# Patient Record
Sex: MALE | Race: OTHER | NOT HISPANIC OR LATINO | URBAN - METROPOLITAN AREA
[De-identification: names, ages, dates, MRNs, and addresses within clinical notes are randomized per-mention and may not be internally consistent; named-entity substitution may affect disease eponyms.]

---

## 2017-05-03 ENCOUNTER — OUTPATIENT (OUTPATIENT)
Dept: OUTPATIENT SERVICES | Facility: HOSPITAL | Age: 37
LOS: 1 days | End: 2017-05-03
Payer: COMMERCIAL

## 2017-05-03 ENCOUNTER — APPOINTMENT (OUTPATIENT)
Dept: MRI IMAGING | Facility: CLINIC | Age: 37
End: 2017-05-03

## 2017-05-03 DIAGNOSIS — Z00.8 ENCOUNTER FOR OTHER GENERAL EXAMINATION: ICD-10-CM

## 2017-05-03 PROCEDURE — 72141 MRI NECK SPINE W/O DYE: CPT

## 2017-10-17 ENCOUNTER — TRANSCRIPTION ENCOUNTER (OUTPATIENT)
Age: 37
End: 2017-10-17

## 2018-01-15 ENCOUNTER — TRANSCRIPTION ENCOUNTER (OUTPATIENT)
Age: 38
End: 2018-01-15

## 2020-04-25 ENCOUNTER — MESSAGE (OUTPATIENT)
Age: 40
End: 2020-04-25

## 2020-05-08 LAB
SARS-COV-2 IGG SERPL IA-ACNC: <0.1 INDEX
SARS-COV-2 IGG SERPL QL IA: NEGATIVE

## 2021-02-05 ENCOUNTER — APPOINTMENT (OUTPATIENT)
Dept: INTERNAL MEDICINE | Facility: CLINIC | Age: 41
End: 2021-02-05
Payer: COMMERCIAL

## 2021-02-05 ENCOUNTER — LABORATORY RESULT (OUTPATIENT)
Age: 41
End: 2021-02-05

## 2021-02-05 VITALS
HEIGHT: 68.25 IN | BODY MASS INDEX: 28.49 KG/M2 | HEART RATE: 91 BPM | WEIGHT: 188 LBS | SYSTOLIC BLOOD PRESSURE: 140 MMHG | OXYGEN SATURATION: 99 % | TEMPERATURE: 97.7 F | DIASTOLIC BLOOD PRESSURE: 90 MMHG

## 2021-02-05 DIAGNOSIS — H57.10 OCULAR PAIN, UNSPECIFIED EYE: ICD-10-CM

## 2021-02-05 DIAGNOSIS — Z70.8 OTHER SEX COUNSELING: ICD-10-CM

## 2021-02-05 DIAGNOSIS — Z83.3 FAMILY HISTORY OF DIABETES MELLITUS: ICD-10-CM

## 2021-02-05 DIAGNOSIS — Z78.9 OTHER SPECIFIED HEALTH STATUS: ICD-10-CM

## 2021-02-05 DIAGNOSIS — Z23 ENCOUNTER FOR IMMUNIZATION: ICD-10-CM

## 2021-02-05 DIAGNOSIS — Z80.49 FAMILY HISTORY OF MALIGNANT NEOPLASM OF OTHER GENITAL ORGANS: ICD-10-CM

## 2021-02-05 DIAGNOSIS — Z82.3 FAMILY HISTORY OF STROKE: ICD-10-CM

## 2021-02-05 DIAGNOSIS — Z82.49 FAMILY HISTORY OF ISCHEMIC HEART DISEASE AND OTHER DISEASES OF THE CIRCULATORY SYSTEM: ICD-10-CM

## 2021-02-05 PROCEDURE — 99072 ADDL SUPL MATRL&STAF TM PHE: CPT

## 2021-02-05 PROCEDURE — 99386 PREV VISIT NEW AGE 40-64: CPT | Mod: 25

## 2021-02-05 PROCEDURE — 36415 COLL VENOUS BLD VENIPUNCTURE: CPT

## 2021-02-05 PROCEDURE — G0444 DEPRESSION SCREEN ANNUAL: CPT | Mod: NC,59

## 2021-02-05 PROCEDURE — 90715 TDAP VACCINE 7 YRS/> IM: CPT

## 2021-02-05 PROCEDURE — 90471 IMMUNIZATION ADMIN: CPT

## 2021-02-05 NOTE — DATA REVIEWED
[No studies available for review at this time.] : No studies available for review at this time. regular rate and rhythm

## 2021-02-05 NOTE — HISTORY OF PRESENT ILLNESS
[FreeTextEntry1] : Establish care  [de-identified] : Mr. JAKUB RODRIGUEZ is a 40 year old man with pmhx of disk disease, prediabetes, hypercholesterolemia who presents to [facility] care. He endorses being in good health and good mood. 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 He has been eating healthy and exercise. He endorses having chronic low back pain, had relief after seeing quiropracter. Has not follow through.

## 2021-02-05 NOTE — ASSESSMENT
[FreeTextEntry1] : #HCM- Continue with healthy diet and exercise. Labs ordered as below. Hep c and HIV ordered. Referral provided as below. Patient has forms for school to be filled out.

## 2021-02-05 NOTE — HEALTH RISK ASSESSMENT
[Yes] : Yes [Monthly or less (1 pt)] : Monthly or less (1 point) [1 or 2 (0 pts)] : 1 or 2 (0 points) [Never (0 pts)] : Never (0 points) [No] : In the past 12 months have you used drugs other than those required for medical reasons? No [No falls in past year] : Patient reported no falls in the past year [0] : 2) Feeling down, depressed, or hopeless: Not at all (0) [HIV Test offered] : HIV Test offered [Hepatitis C test offered] : Hepatitis C test offered [None] : None [With Family] : lives with family [Employed] : employed [Single] : single [Feels Safe at Home] : Feels safe at home [Fully functional (bathing, dressing, toileting, transferring, walking, feeding)] : Fully functional (bathing, dressing, toileting, transferring, walking, feeding) [Fully functional (using the telephone, shopping, preparing meals, housekeeping, doing laundry, using] : Fully functional and needs no help or supervision to perform IADLs (using the telephone, shopping, preparing meals, housekeeping, doing laundry, using transportation, managing medications and managing finances) [Smoke Detector] : smoke detector [Carbon Monoxide Detector] : carbon monoxide detector [Good] : ~his/her~  mood as  good [] : No [[id number]] : 0

## 2021-02-05 NOTE — REVIEW OF SYSTEMS
[Pain] : pain [Back Pain] : back pain [Negative] : Integumentary [Vision Problems] : no vision problems [Abdominal Pain] : no abdominal pain [Nausea] : no nausea [Constipation] : no constipation [Diarrhea] : no diarrhea [Vomiting] : no vomiting [Melena] : no melena [Dysuria] : no dysuria [Hematuria] : no hematuria

## 2021-02-05 NOTE — PHYSICAL EXAM
[No Acute Distress] : no acute distress [Well Nourished] : well nourished [Well Developed] : well developed [Normal Sclera/Conjunctiva] : normal sclera/conjunctiva [EOMI] : extraocular movements intact [Normal Outer Ear/Nose] : the outer ears and nose were normal in appearance [Normal Oropharynx] : the oropharynx was normal [No JVD] : no jugular venous distention [No Lymphadenopathy] : no lymphadenopathy [Supple] : supple [No Respiratory Distress] : no respiratory distress  [Clear to Auscultation] : lungs were clear to auscultation bilaterally [Normal Rate] : normal rate  [Regular Rhythm] : with a regular rhythm [Normal S1, S2] : normal S1 and S2 [No Murmur] : no murmur heard [Pedal Pulses Present] : the pedal pulses are present [No Edema] : there was no peripheral edema [No Extremity Clubbing/Cyanosis] : no extremity clubbing/cyanosis [Soft] : abdomen soft [Non Tender] : non-tender [Non-distended] : non-distended [Normal Bowel Sounds] : normal bowel sounds [Normal Posterior Cervical Nodes] : no posterior cervical lymphadenopathy [Normal Anterior Cervical Nodes] : no anterior cervical lymphadenopathy [No CVA Tenderness] : no CVA  tenderness [No Spinal Tenderness] : no spinal tenderness [No Joint Swelling] : no joint swelling [Grossly Normal Strength/Tone] : grossly normal strength/tone [No Rash] : no rash [Coordination Grossly Intact] : coordination grossly intact [No Focal Deficits] : no focal deficits [Normal Gait] : normal gait [Normal Insight/Judgement] : insight and judgment were intact

## 2021-02-08 LAB
25(OH)D3 SERPL-MCNC: 47.3 NG/ML
ALBUMIN SERPL ELPH-MCNC: 5 G/DL
ALP BLD-CCNC: 61 U/L
ALT SERPL-CCNC: 32 U/L
ANION GAP SERPL CALC-SCNC: 11 MMOL/L
APO LP(A) SERPL-MCNC: 26.3 NMOL/L
APPEARANCE: CLEAR
AST SERPL-CCNC: 28 U/L
BACTERIA: NEGATIVE
BASOPHILS # BLD AUTO: 0.05 K/UL
BASOPHILS NFR BLD AUTO: 0.7 %
BILIRUB SERPL-MCNC: 0.4 MG/DL
BILIRUBIN URINE: NEGATIVE
BLOOD URINE: NEGATIVE
BUN SERPL-MCNC: 17 MG/DL
CALCIUM SERPL-MCNC: 9.8 MG/DL
CHLORIDE SERPL-SCNC: 103 MMOL/L
CHOLEST SERPL-MCNC: 186 MG/DL
CO2 SERPL-SCNC: 26 MMOL/L
COLOR: YELLOW
CREAT SERPL-MCNC: 0.98 MG/DL
CRP SERPL-MCNC: 0.1 MG/DL
EOSINOPHIL # BLD AUTO: 0.18 K/UL
EOSINOPHIL NFR BLD AUTO: 2.6 %
ERYTHROCYTE [SEDIMENTATION RATE] IN BLOOD BY WESTERGREN METHOD: 8 MM/HR
ESTIMATED AVERAGE GLUCOSE: 111 MG/DL
GLUCOSE QUALITATIVE U: NEGATIVE
GLUCOSE SERPL-MCNC: 87 MG/DL
HBA1C MFR BLD HPLC: 5.5 %
HBV CORE IGG+IGM SER QL: NONREACTIVE
HBV SURFACE AB SERPL IA-ACNC: 18.7 MIU/ML
HBV SURFACE AG SER QL: NONREACTIVE
HCT VFR BLD CALC: 45.9 %
HCV AB SER QL: NONREACTIVE
HCV S/CO RATIO: 0.11 S/CO
HDLC SERPL-MCNC: 55 MG/DL
HGB BLD-MCNC: 15.1 G/DL
HIV1+2 AB SPEC QL IA.RAPID: NONREACTIVE
HYALINE CASTS: 0 /LPF
IMM GRANULOCYTES NFR BLD AUTO: 0.3 %
KETONES URINE: NEGATIVE
LDLC SERPL CALC-MCNC: 120 MG/DL
LEUKOCYTE ESTERASE URINE: NEGATIVE
LYMPHOCYTES # BLD AUTO: 2.27 K/UL
LYMPHOCYTES NFR BLD AUTO: 32.3 %
M TB IFN-G BLD-IMP: NEGATIVE
MAN DIFF?: NORMAL
MCHC RBC-ENTMCNC: 28.5 PG
MCHC RBC-ENTMCNC: 32.9 GM/DL
MCV RBC AUTO: 86.8 FL
MEV IGG FLD QL IA: 45.6 AU/ML
MEV IGG+IGM SER-IMP: POSITIVE
MICROSCOPIC-UA: NORMAL
MONOCYTES # BLD AUTO: 0.58 K/UL
MONOCYTES NFR BLD AUTO: 8.3 %
MUV AB SER-ACNC: POSITIVE
MUV IGG SER QL IA: 39.5 AU/ML
NEUTROPHILS # BLD AUTO: 3.92 K/UL
NEUTROPHILS NFR BLD AUTO: 55.8 %
NITRITE URINE: NEGATIVE
NONHDLC SERPL-MCNC: 131 MG/DL
PH URINE: 6
PLATELET # BLD AUTO: 250 K/UL
POTASSIUM SERPL-SCNC: 4.7 MMOL/L
PROT SERPL-MCNC: 7.6 G/DL
PROTEIN URINE: NEGATIVE
PSA SERPL-MCNC: 0.89 NG/ML
QUANTIFERON TB PLUS MITOGEN MINUS NIL: 9.08 IU/ML
QUANTIFERON TB PLUS NIL: 0.09 IU/ML
QUANTIFERON TB PLUS TB1 MINUS NIL: 0 IU/ML
QUANTIFERON TB PLUS TB2 MINUS NIL: 0 IU/ML
RBC # BLD: 5.29 M/UL
RBC # FLD: 12.4 %
RED BLOOD CELLS URINE: 0 /HPF
RUBV IGG FLD-ACNC: 0.7 INDEX
RUBV IGG SER-IMP: NEGATIVE
SODIUM SERPL-SCNC: 140 MMOL/L
SPECIFIC GRAVITY URINE: 1.03
SQUAMOUS EPITHELIAL CELLS: 1 /HPF
TRIGL SERPL-MCNC: 55 MG/DL
TSH SERPL-ACNC: 4.35 UIU/ML
UROBILINOGEN URINE: NORMAL
VIT B12 SERPL-MCNC: 622 PG/ML
VZV AB TITR SER: POSITIVE
VZV IGG SER IF-ACNC: >4000 INDEX
WBC # FLD AUTO: 7.02 K/UL
WHITE BLOOD CELLS URINE: 0 /HPF

## 2021-02-09 ENCOUNTER — TRANSCRIPTION ENCOUNTER (OUTPATIENT)
Age: 41
End: 2021-02-09

## 2021-02-12 ENCOUNTER — APPOINTMENT (OUTPATIENT)
Dept: INTERNAL MEDICINE | Facility: CLINIC | Age: 41
End: 2021-02-12
Payer: COMMERCIAL

## 2021-02-12 DIAGNOSIS — Z23 ENCOUNTER FOR IMMUNIZATION: ICD-10-CM

## 2021-02-12 PROCEDURE — 90707 MMR VACCINE SC: CPT

## 2021-02-12 PROCEDURE — 99072 ADDL SUPL MATRL&STAF TM PHE: CPT

## 2021-02-12 PROCEDURE — 90471 IMMUNIZATION ADMIN: CPT

## 2021-02-16 ENCOUNTER — APPOINTMENT (OUTPATIENT)
Dept: FAMILY MEDICINE | Facility: CLINIC | Age: 41
End: 2021-02-16

## 2021-02-17 ENCOUNTER — NON-APPOINTMENT (OUTPATIENT)
Age: 41
End: 2021-02-17

## 2021-02-19 ENCOUNTER — APPOINTMENT (OUTPATIENT)
Dept: ORTHOPEDIC SURGERY | Facility: CLINIC | Age: 41
End: 2021-02-19
Payer: COMMERCIAL

## 2021-02-19 VITALS — BODY MASS INDEX: 28.49 KG/M2 | WEIGHT: 188 LBS | HEIGHT: 68.25 IN

## 2021-02-19 VITALS — SYSTOLIC BLOOD PRESSURE: 139 MMHG | DIASTOLIC BLOOD PRESSURE: 90 MMHG | HEART RATE: 88 BPM

## 2021-02-19 PROCEDURE — 99072 ADDL SUPL MATRL&STAF TM PHE: CPT

## 2021-02-19 PROCEDURE — 99204 OFFICE O/P NEW MOD 45 MIN: CPT

## 2021-02-19 PROCEDURE — 72110 X-RAY EXAM L-2 SPINE 4/>VWS: CPT

## 2021-02-22 NOTE — HISTORY OF PRESENT ILLNESS
[de-identified] : This is a 40-year-old male that presents with low back pain as well as radicular type symptoms that have been worsening over the past month.  He states that he has pain that radiates down from his hips down to his thighs and into his groin.  It is involving his entire thighs bilaterally.  He denies any bowel bladder issues denies saddle anesthesia.  He does feel like this pain interferes with his ability to walk long distances.  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  He has tried conservative management including Aleve, Flexeril, tramadol, activity modifications but unfortunately his symptoms have persisted.

## 2021-02-22 NOTE — ASSESSMENT
[FreeTextEntry1] : This is a 40-year-old male that is been dealing with worsening back and lower extremity radicular type symptoms.  Given the duration of his symptoms and his mild weakness in his lower extremities I would like to proceed with a lumbar MRI.  I have also prescribed him a new round of physical therapy focused on core strengthening and lumbar stretching exercises.  He can take Tylenol ibuprofen for pain relief.  I will see him back in 2 to 3 weeks for repeat clinical evaluation.  I counseled him to reach out to me if his symptoms worsen or change at any point.\par \par The patient has tried the following treatments:\par Activity modification          +\par Ice/Compression                  +\par Braces                                     -\par NSAIDS                                   +\par Physical Therapy                   -\par \par

## 2021-02-22 NOTE — PHYSICAL EXAM
[de-identified] : Lumbar Physical Exam\par \par Gait -normal\par \par Station -normal\par \par Sagittal balance -all normal\par \par Compensatory mechanism? -None\par \par Heel walk -normal\par \par Toe walk -normal\par \par Reflexes\par Patellar -normal\par Gastroc -normal\par Clonus -no\par \par Hip Exam -normal\par \par Straight leg raise -positive\par \par Pulses -2+ DP/PT\par \par Range of motion -globally rated\par \par Sensation \par Sensation intact to light touch in L1, L2, L3, L4, L5 and S1 dermatomes bilaterally\par \par Motor\par 	IP	Quad	HS	TA	Gastroc	EHL\par Right	4/5	5/5	5/5	5/5	5/5	5/5\par Left	4/5	5/5	5/5	5/5	5/5	5/5\par \par  [de-identified] : Lumbar radiographs\par Lumbar lordosis maintained\par No instability on flexion-extension radiographs\par Minimal facet arthropathy noted

## 2021-02-24 ENCOUNTER — TRANSCRIPTION ENCOUNTER (OUTPATIENT)
Age: 41
End: 2021-02-24

## 2021-02-26 ENCOUNTER — APPOINTMENT (OUTPATIENT)
Dept: MRI IMAGING | Facility: CLINIC | Age: 41
End: 2021-02-26
Payer: COMMERCIAL

## 2021-02-26 ENCOUNTER — OUTPATIENT (OUTPATIENT)
Dept: OUTPATIENT SERVICES | Facility: HOSPITAL | Age: 41
LOS: 1 days | End: 2021-02-26
Payer: COMMERCIAL

## 2021-02-26 ENCOUNTER — RESULT REVIEW (OUTPATIENT)
Age: 41
End: 2021-02-26

## 2021-02-26 DIAGNOSIS — Z00.8 ENCOUNTER FOR OTHER GENERAL EXAMINATION: ICD-10-CM

## 2021-02-26 PROCEDURE — 72148 MRI LUMBAR SPINE W/O DYE: CPT

## 2021-02-26 PROCEDURE — 72148 MRI LUMBAR SPINE W/O DYE: CPT | Mod: 26

## 2021-03-05 ENCOUNTER — APPOINTMENT (OUTPATIENT)
Dept: ORTHOPEDIC SURGERY | Facility: CLINIC | Age: 41
End: 2021-03-05
Payer: COMMERCIAL

## 2021-03-05 VITALS — BODY MASS INDEX: 28.49 KG/M2 | WEIGHT: 188 LBS | HEIGHT: 68.25 IN

## 2021-03-05 DIAGNOSIS — Z78.9 OTHER SPECIFIED HEALTH STATUS: ICD-10-CM

## 2021-03-05 PROCEDURE — 99214 OFFICE O/P EST MOD 30 MIN: CPT

## 2021-03-05 PROCEDURE — 99072 ADDL SUPL MATRL&STAF TM PHE: CPT

## 2021-03-05 NOTE — HISTORY OF PRESENT ILLNESS
[de-identified] : This is a 40-year-old male that is here today for reevaluation of his low back and right-sided radicular type symptoms.  Since her last visit he was on a prednisone taper which she says helped him significantly.  Unfortunately he is still having some residual back and right lower extremity pain.  This pain does interfere with his ability to perform his activities of daily living.  He also feels weakness in his right leg.  He denies any bowel bladder issues.  He denies any saddle anesthesia.

## 2021-03-05 NOTE — PHYSICAL EXAM
[de-identified] : Lumbar Physical Exam\par \par Gait -slightly antalgic gait with pain on the right side\par \par Station -normal\par \par Sagittal balance -all normal\par \par Compensatory mechanism? -None\par \par Reflexes\par Patellar -normal\par Gastroc -normal\par Clonus -no\par \par Hip Exam -normal\par \par Straight leg raise -positive on right\par \par Pulses -2+ DP/PT\par \par Range of motion -globally rated\par \par Sensation \par Sensation intact to light touch in L1, L2, L3, L4, L5 and S1 dermatomes bilaterally\par \par Motor\par 	IP	Quad	HS	TA	Gastroc	EHL\par Right	4/5	5/5	5/5	5/5	5/5	5/5\par Left	5/5	5/5	5/5	5/5	5/5	5/5\par \par Pain and weakness on the right side persists [de-identified] : Lumbar MRI\par Lumbar spondylosis noted\par There is a small to moderate size disc protrusion/herniation at L3-L4 on the right within the foramen\par No areas of spondylolisthesis noted\par

## 2021-03-05 NOTE — ASSESSMENT
[FreeTextEntry1] : This is a 40-year-old male that is dealing with persistent back and right lower extremity radicular type symptoms.  His MRI does show a right-sided foraminal/paracentral disc herniation at L3-L4.  I think he will benefit from an L3-L4 translaminar epidural steroid injection.  This order has been placed today.  He should continue with physical therapy.  I will see him back in 3 to 4 weeks for repeat clinical evaluation.  Please note that over 30 minutes of time was spent in previsit preparation, post visit documentation and in person visit for this patient.

## 2021-04-02 ENCOUNTER — APPOINTMENT (OUTPATIENT)
Dept: ORTHOPEDIC SURGERY | Facility: CLINIC | Age: 41
End: 2021-04-02

## 2022-02-22 ENCOUNTER — NON-APPOINTMENT (OUTPATIENT)
Age: 42
End: 2022-02-22

## 2022-02-22 ENCOUNTER — APPOINTMENT (OUTPATIENT)
Dept: INTERNAL MEDICINE | Facility: CLINIC | Age: 42
End: 2022-02-22
Payer: COMMERCIAL

## 2022-02-22 VITALS
DIASTOLIC BLOOD PRESSURE: 86 MMHG | HEART RATE: 73 BPM | TEMPERATURE: 98.6 F | HEIGHT: 68 IN | OXYGEN SATURATION: 97 % | SYSTOLIC BLOOD PRESSURE: 128 MMHG | WEIGHT: 200 LBS | BODY MASS INDEX: 30.31 KG/M2

## 2022-02-22 DIAGNOSIS — Z00.00 ENCOUNTER FOR GENERAL ADULT MEDICAL EXAMINATION W/OUT ABNORMAL FINDINGS: ICD-10-CM

## 2022-02-22 DIAGNOSIS — E66.3 OVERWEIGHT: ICD-10-CM

## 2022-02-22 PROCEDURE — G0444 DEPRESSION SCREEN ANNUAL: CPT | Mod: 59

## 2022-02-22 PROCEDURE — 93000 ELECTROCARDIOGRAM COMPLETE: CPT | Mod: 59

## 2022-02-22 PROCEDURE — 99396 PREV VISIT EST AGE 40-64: CPT | Mod: 25

## 2022-02-22 PROCEDURE — 36415 COLL VENOUS BLD VENIPUNCTURE: CPT

## 2022-02-22 RX ORDER — IBUPROFEN 800 MG/1
800 TABLET, FILM COATED ORAL 3 TIMES DAILY
Qty: 28 | Refills: 0 | Status: DISCONTINUED | COMMUNITY
Start: 2021-02-19 | End: 2022-02-22

## 2022-02-22 RX ORDER — OMEPRAZOLE 20 MG/1
20 CAPSULE, DELAYED RELEASE ORAL TWICE DAILY
Qty: 30 | Refills: 0 | Status: DISCONTINUED | COMMUNITY
Start: 2021-02-19 | End: 2022-02-22

## 2022-02-22 RX ORDER — ACETAMINOPHEN 500 MG/1
500 TABLET, COATED ORAL
Qty: 50 | Refills: 0 | Status: DISCONTINUED | COMMUNITY
Start: 2021-02-19 | End: 2022-02-22

## 2022-02-22 RX ORDER — NAPROXEN 500 MG/1
500 TABLET ORAL
Qty: 60 | Refills: 1 | Status: DISCONTINUED | COMMUNITY
Start: 2021-02-05 | End: 2022-02-22

## 2022-02-22 NOTE — HEALTH RISK ASSESSMENT
[Good] : ~his/her~  mood as  good [Never] : Never [Yes] : Yes [Monthly or less (1 pt)] : Monthly or less (1 point) [1 or 2 (0 pts)] : 1 or 2 (0 points) [Never (0 pts)] : Never (0 points) [No] : In the past 12 months have you used drugs other than those required for medical reasons? No [No falls in past year] : Patient reported no falls in the past year [0] : 2) Feeling down, depressed, or hopeless: Not at all (0) [PHQ-2 Negative - No further assessment needed] : PHQ-2 Negative - No further assessment needed [Audit-CScore] : 1 [de-identified] : walking [de-identified] : balanced [[id number]] : 0 [HIV Test offered] : HIV Test offered [Hepatitis C test offered] : Hepatitis C test offered [None] : None [With Family] : lives with family [Employed] : employed [Single] : single [Feels Safe at Home] : Feels safe at home [Fully functional (bathing, dressing, toileting, transferring, walking, feeding)] : Fully functional (bathing, dressing, toileting, transferring, walking, feeding) [Fully functional (using the telephone, shopping, preparing meals, housekeeping, doing laundry, using] : Fully functional and needs no help or supervision to perform IADLs (using the telephone, shopping, preparing meals, housekeeping, doing laundry, using transportation, managing medications and managing finances) [Smoke Detector] : smoke detector [Carbon Monoxide Detector] : carbon monoxide detector [Patient/Caregiver not ready to engage] : , patient/caregiver not ready to engage

## 2022-02-22 NOTE — PHYSICAL EXAM
[No Acute Distress] : no acute distress [Well Nourished] : well nourished [Well Developed] : well developed [Normal Sclera/Conjunctiva] : normal sclera/conjunctiva [EOMI] : extraocular movements intact [Normal Outer Ear/Nose] : the outer ears and nose were normal in appearance [Normal Oropharynx] : the oropharynx was normal [No JVD] : no jugular venous distention [No Lymphadenopathy] : no lymphadenopathy [Supple] : supple [No Respiratory Distress] : no respiratory distress  [Clear to Auscultation] : lungs were clear to auscultation bilaterally [Normal Rate] : normal rate  [Regular Rhythm] : with a regular rhythm [Normal S1, S2] : normal S1 and S2 [No Murmur] : no murmur heard [Pedal Pulses Present] : the pedal pulses are present [No Edema] : there was no peripheral edema [No Extremity Clubbing/Cyanosis] : no extremity clubbing/cyanosis [Soft] : abdomen soft [Non Tender] : non-tender [Non-distended] : non-distended [Normal Bowel Sounds] : normal bowel sounds [Normal Posterior Cervical Nodes] : no posterior cervical lymphadenopathy [Normal Anterior Cervical Nodes] : no anterior cervical lymphadenopathy [No CVA Tenderness] : no CVA  tenderness [No Spinal Tenderness] : no spinal tenderness [No Joint Swelling] : no joint swelling [Grossly Normal Strength/Tone] : grossly normal strength/tone [No Rash] : no rash [Coordination Grossly Intact] : coordination grossly intact [No Focal Deficits] : no focal deficits [Normal Gait] : normal gait [Normal Insight/Judgement] : insight and judgment were intact [Comprehensive Foot Exam Normal] : Right and left foot were examined and both feet are normal. No ulcers in either foot. Toes are normal and with full ROM.  Normal tactile sensation with monofilament testing throughout both feet

## 2022-02-22 NOTE — HISTORY OF PRESENT ILLNESS
[FreeTextEntry1] : Establish care  [de-identified] : Mr. JAKUB RODRIGUEZ is a 41 year old man with pmhx of spinal disk disease s/p epidural injection, prediabetes, hypercholesterolemia who presents for annual physical. He endorses being in good health and mood. He had epidural last year. He endorses his symptoms improved from that. He occasionally get exacerbation of back pain. He is not taking any medication.

## 2022-02-22 NOTE — REVIEW OF SYSTEMS
[Pain] : pain [Vision Problems] : no vision problems [Abdominal Pain] : no abdominal pain [Nausea] : no nausea [Constipation] : no constipation [Diarrhea] : no diarrhea [Vomiting] : no vomiting [Melena] : no melena [Dysuria] : no dysuria [Hematuria] : no hematuria [Back Pain] : back pain [Negative] : Integumentary

## 2022-02-22 NOTE — ASSESSMENT
[FreeTextEntry1] : 1) HCM- Continue with healthy diet and exercise. Labs ordered as below. Hep c and HIV negative in the past. Referral provided as below.\par \par 2) Back pain - stable. NSAIDs prn.

## 2022-02-22 NOTE — COUNSELING
[Fall prevention counseling provided] : Fall prevention counseling provided [Potential consequences of obesity discussed] : Potential consequences of obesity discussed

## 2022-02-23 LAB
25(OH)D3 SERPL-MCNC: 39.1 NG/ML
ALBUMIN SERPL ELPH-MCNC: 4.9 G/DL
ALP BLD-CCNC: 66 U/L
ALT SERPL-CCNC: 48 U/L
ANION GAP SERPL CALC-SCNC: 14 MMOL/L
APPEARANCE: CLEAR
AST SERPL-CCNC: 37 U/L
BACTERIA: NEGATIVE
BASOPHILS # BLD AUTO: 0.08 K/UL
BASOPHILS NFR BLD AUTO: 1.1 %
BILIRUB SERPL-MCNC: 0.6 MG/DL
BILIRUBIN URINE: NEGATIVE
BLOOD URINE: NEGATIVE
BUN SERPL-MCNC: 15 MG/DL
CALCIUM SERPL-MCNC: 9.8 MG/DL
CHLORIDE SERPL-SCNC: 101 MMOL/L
CHOLEST SERPL-MCNC: 212 MG/DL
CO2 SERPL-SCNC: 25 MMOL/L
COLOR: YELLOW
CREAT SERPL-MCNC: 0.93 MG/DL
EOSINOPHIL # BLD AUTO: 0.18 K/UL
EOSINOPHIL NFR BLD AUTO: 2.4 %
ESTIMATED AVERAGE GLUCOSE: 120 MG/DL
GLUCOSE QUALITATIVE U: NEGATIVE
GLUCOSE SERPL-MCNC: 96 MG/DL
HBA1C MFR BLD HPLC: 5.8 %
HCT VFR BLD CALC: 47.2 %
HDLC SERPL-MCNC: 54 MG/DL
HGB BLD-MCNC: 15.7 G/DL
HYALINE CASTS: 0 /LPF
IMM GRANULOCYTES NFR BLD AUTO: 0.3 %
KETONES URINE: NEGATIVE
LDLC SERPL CALC-MCNC: 137 MG/DL
LEUKOCYTE ESTERASE URINE: NEGATIVE
LYMPHOCYTES # BLD AUTO: 2.28 K/UL
LYMPHOCYTES NFR BLD AUTO: 30.8 %
MAN DIFF?: NORMAL
MCHC RBC-ENTMCNC: 27.8 PG
MCHC RBC-ENTMCNC: 33.3 GM/DL
MCV RBC AUTO: 83.7 FL
MICROSCOPIC-UA: NORMAL
MONOCYTES # BLD AUTO: 0.56 K/UL
MONOCYTES NFR BLD AUTO: 7.6 %
NEUTROPHILS # BLD AUTO: 4.29 K/UL
NEUTROPHILS NFR BLD AUTO: 57.8 %
NITRITE URINE: NEGATIVE
NONHDLC SERPL-MCNC: 158 MG/DL
PH URINE: 6.5
PLATELET # BLD AUTO: 260 K/UL
POTASSIUM SERPL-SCNC: 4.4 MMOL/L
PROT SERPL-MCNC: 7.5 G/DL
PROTEIN URINE: NORMAL
RBC # BLD: 5.64 M/UL
RBC # FLD: 12.9 %
RED BLOOD CELLS URINE: 0 /HPF
SODIUM SERPL-SCNC: 141 MMOL/L
SPECIFIC GRAVITY URINE: 1.03
SQUAMOUS EPITHELIAL CELLS: 0 /HPF
TRIGL SERPL-MCNC: 105 MG/DL
TSH SERPL-ACNC: 2.56 UIU/ML
UROBILINOGEN URINE: NORMAL
VIT B12 SERPL-MCNC: 562 PG/ML
WBC # FLD AUTO: 7.41 K/UL
WHITE BLOOD CELLS URINE: 0 /HPF

## 2022-03-07 ENCOUNTER — NON-APPOINTMENT (OUTPATIENT)
Age: 42
End: 2022-03-07

## 2022-04-06 ENCOUNTER — EMERGENCY (EMERGENCY)
Facility: HOSPITAL | Age: 42
LOS: 1 days | Discharge: HOME | End: 2022-04-06
Attending: EMERGENCY MEDICINE
Payer: COMMERCIAL

## 2022-04-06 VITALS
RESPIRATION RATE: 18 BRPM | HEART RATE: 82 BPM | WEIGHT: 203.93 LBS | SYSTOLIC BLOOD PRESSURE: 144 MMHG | TEMPERATURE: 98 F | HEIGHT: 69 IN | DIASTOLIC BLOOD PRESSURE: 91 MMHG | OXYGEN SATURATION: 100 %

## 2022-04-06 DIAGNOSIS — R11.0 NAUSEA: ICD-10-CM

## 2022-04-06 DIAGNOSIS — H81.10 BENIGN PAROXYSMAL VERTIGO, UNSPECIFIED EAR: ICD-10-CM

## 2022-04-06 DIAGNOSIS — R42 DIZZINESS AND GIDDINESS: ICD-10-CM

## 2022-04-06 LAB
ALBUMIN SERPL ELPH-MCNC: 5.1 G/DL — HIGH (ref 3.3–5)
ALP SERPL-CCNC: 67 U/L — SIGNIFICANT CHANGE UP (ref 40–120)
ALT FLD-CCNC: 45 U/L — SIGNIFICANT CHANGE UP (ref 10–45)
ANION GAP SERPL CALC-SCNC: 14 MMOL/L — SIGNIFICANT CHANGE UP (ref 5–17)
APTT BLD: 28.5 SEC — SIGNIFICANT CHANGE UP (ref 27.5–35.5)
AST SERPL-CCNC: 34 U/L — SIGNIFICANT CHANGE UP (ref 10–40)
BASOPHILS # BLD AUTO: 0.08 K/UL — SIGNIFICANT CHANGE UP (ref 0–0.2)
BASOPHILS NFR BLD AUTO: 0.9 % — SIGNIFICANT CHANGE UP (ref 0–2)
BILIRUB SERPL-MCNC: 0.6 MG/DL — SIGNIFICANT CHANGE UP (ref 0.2–1.2)
BUN SERPL-MCNC: 12 MG/DL — SIGNIFICANT CHANGE UP (ref 7–23)
CALCIUM SERPL-MCNC: 10 MG/DL — SIGNIFICANT CHANGE UP (ref 8.4–10.5)
CHLORIDE SERPL-SCNC: 102 MMOL/L — SIGNIFICANT CHANGE UP (ref 96–108)
CO2 SERPL-SCNC: 25 MMOL/L — SIGNIFICANT CHANGE UP (ref 22–31)
CREAT SERPL-MCNC: 0.86 MG/DL — SIGNIFICANT CHANGE UP (ref 0.5–1.3)
EGFR: 111 ML/MIN/1.73M2 — SIGNIFICANT CHANGE UP
EOSINOPHIL # BLD AUTO: 0.11 K/UL — SIGNIFICANT CHANGE UP (ref 0–0.5)
EOSINOPHIL NFR BLD AUTO: 1.3 % — SIGNIFICANT CHANGE UP (ref 0–6)
GLUCOSE SERPL-MCNC: 109 MG/DL — HIGH (ref 70–99)
HCT VFR BLD CALC: 48.1 % — SIGNIFICANT CHANGE UP (ref 39–50)
HGB BLD-MCNC: 16 G/DL — SIGNIFICANT CHANGE UP (ref 13–17)
IMM GRANULOCYTES NFR BLD AUTO: 0.4 % — SIGNIFICANT CHANGE UP (ref 0–1.5)
INR BLD: 1.11 RATIO — SIGNIFICANT CHANGE UP (ref 0.88–1.16)
LYMPHOCYTES # BLD AUTO: 2.37 K/UL — SIGNIFICANT CHANGE UP (ref 1–3.3)
LYMPHOCYTES # BLD AUTO: 27.7 % — SIGNIFICANT CHANGE UP (ref 13–44)
MCHC RBC-ENTMCNC: 28 PG — SIGNIFICANT CHANGE UP (ref 27–34)
MCHC RBC-ENTMCNC: 33.3 GM/DL — SIGNIFICANT CHANGE UP (ref 32–36)
MCV RBC AUTO: 84.2 FL — SIGNIFICANT CHANGE UP (ref 80–100)
MONOCYTES # BLD AUTO: 0.58 K/UL — SIGNIFICANT CHANGE UP (ref 0–0.9)
MONOCYTES NFR BLD AUTO: 6.8 % — SIGNIFICANT CHANGE UP (ref 2–14)
NEUTROPHILS # BLD AUTO: 5.38 K/UL — SIGNIFICANT CHANGE UP (ref 1.8–7.4)
NEUTROPHILS NFR BLD AUTO: 62.9 % — SIGNIFICANT CHANGE UP (ref 43–77)
NRBC # BLD: 0 /100 WBCS — SIGNIFICANT CHANGE UP (ref 0–0)
PLATELET # BLD AUTO: 254 K/UL — SIGNIFICANT CHANGE UP (ref 150–400)
POTASSIUM SERPL-MCNC: 3.9 MMOL/L — SIGNIFICANT CHANGE UP (ref 3.5–5.3)
POTASSIUM SERPL-SCNC: 3.9 MMOL/L — SIGNIFICANT CHANGE UP (ref 3.5–5.3)
PROT SERPL-MCNC: 7.8 G/DL — SIGNIFICANT CHANGE UP (ref 6–8.3)
PROTHROM AB SERPL-ACNC: 12.8 SEC — SIGNIFICANT CHANGE UP (ref 10.5–13.4)
RBC # BLD: 5.71 M/UL — SIGNIFICANT CHANGE UP (ref 4.2–5.8)
RBC # FLD: 12.7 % — SIGNIFICANT CHANGE UP (ref 10.3–14.5)
SODIUM SERPL-SCNC: 141 MMOL/L — SIGNIFICANT CHANGE UP (ref 135–145)
WBC # BLD: 8.55 K/UL — SIGNIFICANT CHANGE UP (ref 3.8–10.5)
WBC # FLD AUTO: 8.55 K/UL — SIGNIFICANT CHANGE UP (ref 3.8–10.5)

## 2022-04-06 PROCEDURE — 93010 ELECTROCARDIOGRAM REPORT: CPT

## 2022-04-06 PROCEDURE — 70496 CT ANGIOGRAPHY HEAD: CPT | Mod: 26,MA

## 2022-04-06 PROCEDURE — 99285 EMERGENCY DEPT VISIT HI MDM: CPT

## 2022-04-06 PROCEDURE — 70498 CT ANGIOGRAPHY NECK: CPT | Mod: 26,MA

## 2022-04-06 RX ORDER — MECLIZINE HCL 12.5 MG
25 TABLET ORAL ONCE
Refills: 0 | Status: COMPLETED | OUTPATIENT
Start: 2022-04-06 | End: 2022-04-06

## 2022-04-06 RX ORDER — ASPIRIN/CALCIUM CARB/MAGNESIUM 324 MG
325 TABLET ORAL ONCE
Refills: 0 | Status: COMPLETED | OUTPATIENT
Start: 2022-04-06 | End: 2022-04-06

## 2022-04-06 RX ORDER — ONDANSETRON 4 MG/1
4 TABLET, ORALLY DISINTEGRATING ORAL 3 TIMES DAILY
Qty: 10 | Refills: 0 | Status: ACTIVE | COMMUNITY
Start: 2022-04-06 | End: 1900-01-01

## 2022-04-06 RX ORDER — MECLIZINE HYDROCHLORIDE 25 MG/1
25 TABLET ORAL 3 TIMES DAILY
Qty: 30 | Refills: 1 | Status: ACTIVE | COMMUNITY
Start: 2022-04-06 | End: 1900-01-01

## 2022-04-06 RX ORDER — ASPIRIN/CALCIUM CARB/MAGNESIUM 324 MG
325 TABLET ORAL ONCE
Refills: 0 | Status: DISCONTINUED | OUTPATIENT
Start: 2022-04-06 | End: 2022-04-06

## 2022-04-06 RX ORDER — MECLIZINE HYDROCHLORIDE 25 MG/1
25 TABLET ORAL 3 TIMES DAILY
Qty: 30 | Refills: 0 | Status: ACTIVE | COMMUNITY
Start: 2022-04-06 | End: 1900-01-01

## 2022-04-06 RX ADMIN — Medication 25 MILLIGRAM(S): at 19:28

## 2022-04-06 RX ADMIN — Medication 325 MILLIGRAM(S): at 23:18

## 2022-04-06 NOTE — CONSULT NOTE ADULT - ASSESSMENT
41 year old left-handed man with past medical history cervical and lumbar disc herniations, HTN, pre-diabetes presents as CODE STROKE for vertigo starting at 1:00am on 4/6 while awake and right-sided paresthesias of face (around mouth), arm and leg approx 3 hours later. LKN 4/5/22 10:00pm, preMRS 0, NIHSS 1. Patient outside window for tPA. CT angio head/neck was negative for LVO, therefore patient was not a candidate for mechanical thrombectomy.    Impression: Episodic vertigo possibly secondary to vestibular dysfunction of unknown etiology, vertebrobasilar insufficiency ruled out. Persistent pure right sensory syndrome may be secondary to a small ischemic infarct of left thalamus, acute hemorrhage ruled out. Another consideration is peripheral neuropathy/radiculopathy due to multiple disc herniations in cervical and lumbar spine.    Recommendations:  ADMIT TO MEDICINE  [] NPO unless passes dysphagia screen; Swallow eval if fails.   [] Keep BP permissive up to 220/110 for 48 hours followed by gradual normotension   [] Meclizine 25mg Q8H PRN x7 days, Valium 2mg Q12H PRN for up to 48 hours  [] Load with Aspirin 325mg and start Aspirin 81mg tomorrow (may discontinue if MRI brain is negative for stroke)  [] MRI brain w/o contrast  [] MRI cervical and lumbar spine w/o contrast can be done as outpatient and should not delay discharge  [] Lipid profile, HbA1C  [] TTE w/ bubble if MRI + stroke  [] Will start Lipitor 40mg if MRI + stroke  [] PT/OT  [] Vestibular rehab referral  [] Telemetry   [] Q4H neurochecks/vitals    Patient to f/u with Vascular Neurology upon discharge:  Dr. Sotero Weiner  [address] [place] [address]  [phone]    Patient to be seen and discussed w/ neurovascular attending, Dr. Sotero Weiner.    Kell Cuello,   PGY-3  Neurology Resident     41 year old left-handed man with past medical history cervical and lumbar disc herniations, HTN, pre-diabetes presents as CODE STROKE for vertigo starting at 1:00am on 4/6 while awake and right-sided paresthesias of face (around mouth), arm and leg approx 3 hours later. LKN 4/5/22 10:00pm, preMRS 0, NIHSS 1. Patient outside window for tPA. CT angio head/neck was negative for LVO, therefore patient was not a candidate for mechanical thrombectomy.    Impression: Episodic vertigo possibly secondary to vestibular dysfunction of unknown etiology (BPPV vs. vestibular neuritis), given absence of nystagmus vertebrobasilar insufficiency ruled out. Persistent right hemisensory loss secondary to pure right sensory syndrome may be 2/2 small ischemic infarct of left thalamus, acute hemorrhage ruled out. Another consideration is peripheral neuropathy/radiculopathy due to multiple disc herniations in cervical and lumbar spine.    Recommendations:  ADMIT TO MEDICINE  [] NPO unless passes dysphagia screen; Swallow eval if fails.   [] Keep BP permissive up to 220/110 for 48 hours followed by gradual normotension   [] Meclizine 25mg Q8H PRN x7 days, Valium 2mg Q12H PRN for up to 48 hours  [] Load with Aspirin 325mg and start Aspirin 81mg tomorrow (may discontinue if MRI brain is negative for stroke)  [] MRI brain w/o contrast  [] MRI cervical and lumbar spine w/o contrast can be done as outpatient and should not delay discharge  [] Lipid profile, HbA1C  [] TTE w/ bubble if MRI + stroke  [] Will start Lipitor 40mg if MRI + stroke  [] PT/OT  [] Vestibular rehab referral  [] Telemetry   [] Q4H neurochecks/vitals    Patient to f/u with Vascular Neurology upon discharge:  Dr. Sotero Weiner  [address] [place] [address] [phone]    Patient to be seen and discussed w/ neurovascular attending, Dr. Sotero Weiner.    Kell Cuello, DO  PGY-3  Neurology Resident     41 year old left-handed man with past medical history cervical and lumbar disc herniations, HTN, pre-diabetes presents as CODE STROKE for vertigo starting at 1:00am on 4/6 while awake and right-sided paresthesias of face (around mouth), arm and leg approx 3 hours later. LKN 4/5/22 10:00pm, preMRS 0, NIHSS 1. Patient outside window for tPA. CT angio head/neck was negative for LVO, therefore patient was not a candidate for mechanical thrombectomy. There was no flow-limiting stenosis extra- or intracranially.    Impression: Episodic positional vertigo possibly secondary to vestibular dysfunction of unknown etiology (BPPV vs. vestibular neuritis), given absence of nystagmus vertebrobasilar insufficiency ruled out. Persistent right hemisensory loss secondary to pure right sensory syndrome may be 2/2 small ischemic infarct of left thalamus, acute hemorrhage ruled out. Another consideration is peripheral neuropathy/radiculopathy due to multiple disc herniations in cervical and lumbar spine.    Recommendations:  ADMIT TO MEDICINE  [] NPO unless passes dysphagia screen; Swallow eval if fails.   [] Keep BP permissive up to 220/110 for 48 hours followed by gradual normotension   [] Orthostatic vital signs  [] Meclizine 25mg Q8H PRN x7 days, Valium 2mg Q12H PRN for up to 48 hours  [] Load with Aspirin 325mg and start Aspirin 81mg tomorrow (may discontinue if MRI brain is negative for stroke)  [] MRI brain w/o contrast  [] MRI cervical and lumbar spine w/o contrast can be done as outpatient and should not delay discharge  [] Lipid profile, HbA1C  [] TTE w/ bubble if MRI + stroke  [] Will start Lipitor 40mg if MRI + stroke  [] PT/OT  [] Vestibular rehab referral  [] Telemetry   [] Q4H neurochecks/vitals    Patient to f/u with Vascular Neurology upon discharge:  Dr. Sotero Weiner  [address] [place] [address] [phone]    Patient to be seen and discussed w/ neurovascular attending, Dr. Sotero Weiner.    Kell Cuello,   PGY-3  Neurology Resident     41 year old left-handed man with past medical history cervical and lumbar disc herniations, HTN, pre-diabetes presents as CODE STROKE for vertigo starting at 1:00am on 4/6 while awake and right-sided paresthesias of face (around mouth), arm and leg approx 3 hours later. LKN 4/5/22 10:00pm, preMRS 0, NIHSS 1. Patient outside window for tPA. CT angio head/neck was negative for LVO, therefore patient was not a candidate for mechanical thrombectomy. There was no flow-limiting stenosis extra- or intracranially.    Impression: Episodic positional vertigo possibly secondary to vestibular dysfunction of unknown etiology (BPPV vs. vestibular neuritis); given absence of nystagmus, vertebrobasilar insufficiency was ruled out. Persistent right hemisensory loss secondary to pure right sensory syndrome may be 2/2 small ischemic infarct of left thalamus, acute hemorrhage ruled out. Another consideration is peripheral neuropathy/radiculopathy due to multiple disc herniations in cervical and lumbar spine.    Recommendations:  ADMIT TO MEDICINE  [] NPO unless passes dysphagia screen; Swallow eval if fails.   [] Keep BP permissive up to 220/110 for 48 hours followed by gradual normotension   [] Orthostatic vital signs  [] Meclizine 25mg Q8H PRN x7 days, Valium 2mg Q12H PRN for up to 48 hours  [] Load with Aspirin 325mg and start Aspirin 81mg tomorrow (may discontinue if MRI brain is negative for stroke)  [] MRI brain w/o contrast  [] MRI cervical and lumbar spine w/o contrast can be done as outpatient and should not delay discharge  [] Lipid profile, HbA1C  [] TTE w/ bubble if MRI + stroke  [] Will start Lipitor 40mg if MRI + stroke  [] PT/OT  [] Vestibular rehab referral  [] Telemetry   [] Q4H neurochecks/vitals    Patient to f/u with Vascular Neurology upon discharge:  Dr. Sotero Weiner  [address] [place] [address] [phone]    Patient to be seen and discussed w/ neurovascular attending, Dr. Sotero Weiner.    Kell Cuello,   PGY-3  Neurology Resident     41 year old left-handed man with past medical history cervical and lumbar disc herniations, HTN, pre-diabetes presents as CODE STROKE for vertigo starting at 1:00am on 4/6 while awake and right-sided paresthesias of face (around mouth), arm and leg approx 3 hours later. LKN 4/5/22 10:00pm, preMRS 0, NIHSS 1. Patient outside window for tPA. CT angio head/neck was negative for LVO, therefore patient was not a candidate for mechanical thrombectomy. There was no flow-limiting stenosis extra- or intracranially.    Impression: Episodic positional vertigo possibly secondary to vestibular dysfunction of unknown etiology (BPPV vs. vestibular neuritis); given absence of nystagmus, vertebrobasilar insufficiency was ruled out. Persistent right hemisensory loss secondary to pure right sensory syndrome may be 2/2 small ischemic infarct of left thalamus, acute hemorrhage ruled out. Another consideration is peripheral neuropathy/radiculopathy due to multiple disc herniations in cervical and lumbar spine.    Recommendations:   [] Keep BP permissive up to 220/110 for 48 hours followed by gradual normotension   [] Orthostatic vital signs  [] Meclizine 25mg Q8H PRN x7 days, Valium 2mg Q12H PRN for up to 48 hours  [] MRI brain w/o contrast can be done as outpatient  [] Load with Aspirin 325mg and patient can start Aspirin 81mg tomorrow (may discontinue if MRI brain is negative for stroke)  [] MRI cervical and lumbar spine w/o contrast can be done as outpatient  [] Lipid profile, HbA1C  [] TTE w/ bubble as outpatient if MRI + stroke  [] Vestibular rehab referral  [] Telemetry   [] Q4H neurochecks/vitals    Patient to f/u with Vascular Neurology upon discharge:  Dr. Taj Floyd  [address] [place] [address]  [phone]    Plan discussed w/ neurovascular attending, Dr. Pawel Floyd.    Kell Cuello DO  PGY-3  Neurology Resident     41 year old left-handed man with past medical history cervical and lumbar disc herniations, HTN, pre-diabetes presents as CODE STROKE for vertigo starting at 1:00am on 4/6 while awake and right-sided paresthesias of face (around mouth), arm and leg approx 3 hours later. LKN 4/5/22 10:00pm, preMRS 0, NIHSS 1. Patient outside window for tPA. CT angio head/neck was negative for LVO, therefore patient was not a candidate for mechanical thrombectomy. There was no flow-limiting stenosis extra- or intracranially.    Impression: Episodic positional vertigo possibly secondary to vestibular dysfunction of unknown etiology (BPPV vs. vestibular neuritis); given absence of nystagmus, vertebrobasilar insufficiency was ruled out. Persistent right hemisensory loss secondary to pure right sensory syndrome may be 2/2 small ischemic infarct of left thalamus, acute hemorrhage ruled out. Another consideration is peripheral neuropathy/radiculopathy due to multiple disc herniations in cervical and lumbar spine.    Recommendations:   [] Keep BP permissive up to 220/110 for 48 hours followed by gradual normotension   [] Orthostatic vital signs  [] Meclizine 25mg Q8H PRN x7 days, Valium 2mg Q12H PRN for up to 48 hours  [] MRI brain w/o contrast can be done as outpatient  [] Load with Aspirin 325mg and patient can start Aspirin 81mg tomorrow (may discontinue if MRI brain is negative for stroke)  [] MRI cervical and lumbar spine w/o contrast can be done as outpatient  [] Lipid profile, HbA1C  [] TTE w/ bubble as outpatient if MRI + stroke  [] Vestibular rehab referral  [] Telemetry   [] Q4H neurochecks/vitals    No further inpatient neurological workup required.    Patient to f/u with Vascular Neurology upon discharge:  Dr. Taj Floyd  [address] [place] [address]  [phone]    Plan discussed w/ neurovascular attending, Dr. Pawel Floyd.    Kell Cuello,   PGY-3  Neurology Resident

## 2022-04-06 NOTE — ED PROVIDER NOTE - PHYSICAL EXAMINATION
Attending MD Austin:    Gen: awake and alert, no apparent distress   Neck: supple, no meningismus   CV: heart with reg rhythm, no obvious murmur appreciated. radial pulses 2+ bilaterally, upper and lower extremities warm to the touch   Resp: clear to auscultation anteriorly bilaterally, breathing comfortably  Abd: soft, NT, ND  Extremities: ankles warm to the touch, no appreciable ankle edema bilaterally  Pysch: appropriate affect    Neuro: Awake and alert. Symmetric eyebrow raise, symmetric eyelid closure. PERRL b/l, EOMI b/l, no spontaneous nystagmus observed, symmetric smile, tongue midline.  5/5  strength bilaterally, 5/5 elbow flexion bilaterally, 5/5 elbow extension bilaterally, 5/5 shoulder shrug b/l.  5/5 plantar and dorsiflexion b/l, 5/5 knee flexion and extension b/l, 5/5 hip flexion and extension b/l. Sensation intact and symmetric grossly to light touch throughout face and bilateral upper and lower extremities,  Finger to nose normal bilaterally. Steady gait

## 2022-04-06 NOTE — ED ADULT TRIAGE NOTE - CHIEF COMPLAINT QUOTE
Dizziness at 01:00 today that resided and intermittent numbness on the face more to the R and down the arm since 4:00 this morning.

## 2022-04-06 NOTE — ED ADULT NURSE NOTE - OBJECTIVE STATEMENT
42yo M pmh vertigo presents to ED ambulatory from home with wife at bedside, with c/o dizziness, numbness, and tingling last night. Pt reports sudden onset of dizziness last night at 1am while awake, as well as associated R sided numbness and tingling. Pt is currently endorsing numbness and tingling, and dizziness upon moving his head. upon arrival, pt is a&ox4, well-appearing, VSS, airway patent, spontaneous unlabored respirations, coherent speech, speaking in full sentences, ambulatory. See paper chart for neuro flow-sheet. Pt seen and eval by MD. IV placed, labs sent. Pt sent to CT. 40yo M pmh vertigo presents to ED ambulatory from home with sister at bedside, with c/o dizziness, numbness, and tingling last night. Pt reports sudden onset of dizziness last night at 1am while awake, as well as associated R sided numbness and tingling. Pt is currently endorsing the same numbness and tingling, dizziness upon moving his head, and associated nausea. pt denies any vision changes, v/d, cp, sob, or difficulty swallowing. upon arrival, pt is a&ox4, well-appearing, airway patent, spontaneous unlabored respirations, coherent speech, speaking in full sentences, ambulatory, PERRL, equal strength and sensation b/l. See paper chart for neuro flow-sheet. Passed dysphagia screening. Pt seen and eval by MD. IV placed, labs sent. Pt sent to CT.

## 2022-04-06 NOTE — ED PROVIDER NOTE - OBJECTIVE STATEMENT
40 yo male with no PMHx p/w dizziness.  Patient reports that he developed room spinning dizziness starting around 1am last night.  DIzziness was worse with position changes. After a few hours, patient noted right sided tingling in the upper and lower extremity.  Symptoms have persisted since onset.  Contacted his medical doctor who sent him a rx for meclizine, but did not start taking it yet.  Patient denies headache, blurry vision, weakness, slurred speech, facial droop, CP, SOB, abd pian, NVD, dysuria

## 2022-04-06 NOTE — CONSULT NOTE ADULT - SUBJECTIVE AND OBJECTIVE BOX
HPI:  41 year old left-handed man with past medical history cervical and lumbar disc herniations, HTN, pre-diabetes presents as CODE STROKE for vertigo starting at 1:00am on 4/6 while awake and right-sided paresthesias of face, arm and leg shortly after a few hours. Symptoms have persisted since then. He describes dizziness as room-spinning with changes in head position. He had recurrence during car ride to the hospital. Patient called his PCP who sent in prescription for meclizine, however due to prominent family history of stroke, patient was worried and came to ED for further evaluation. Patient denies facial droop, blurry vision, double vision, speech disturbances (no dysarthria or word-finding difficulties), gait imbalance, vomiting, headache, weakness, fall, head trauma, fevers, chills.    LKN 4/5/22 10:00pm, preMRS 0, NIHSS 1. Patient outside window for tPA. CT angio head/neck was negative for LVO, therefore patient was not a candidate for mechanical thrombectomy.    MEDICATIONS  (STANDING):    MEDICATIONS  (PRN):    PAST MEDICAL & SURGICAL HISTORY:  Disc herniations in spine  HTN  Pre-diabetes    FAMILY HISTORY:  Stroke    Allergies    No Known Allergies    Intolerances        SHx - No smoking, No ETOH, No drug abuse    Review of Systems:  CONSTITUTIONAL:   HEENT:  No visual loss, blurred vision, double vision.  No hearing loss, sneezing, congestion, runny nose or sore throat.  SKIN:  No rash or itching.  CARDIOVASCULAR:  No chest pain, chest pressure or chest discomfort. No palpitations or edema.  RESPIRATORY:  No shortness of breath, cough or sputum.  GASTROINTESTINAL:  No anorexia, nausea, vomiting or diarrhea. No abdominal pain.  GENITOURINARY:  NO dysuria. No increased frequency. No retention. No incontinence.  NEUROLOGICAL:  See HPI  MUSCULOSKELETAL:  No muscle, back pain, joint pain or stiffness.  HEMATOLOGIC:  No anemia, bleeding or bruising.  PSYCHIATRIC:    ENDOCRINOLOGIC:  No reports of sweating, cold or heat intolerance. No polyuria or polydipsia.        Vital Signs Last 24 Hrs  T(C): 36.7 (06 Apr 2022 19:21), Max: 36.7 (06 Apr 2022 18:35)  T(F): 98 (06 Apr 2022 19:21), Max: 98 (06 Apr 2022 18:35)  HR: 75 (06 Apr 2022 19:21) (75 - 96)  BP: 130/84 (06 Apr 2022 19:21) (130/84 - 161/102)  BP(mean): 122 (06 Apr 2022 18:35) (122 - 122)  RR: 17 (06 Apr 2022 19:21) (17 - 18)  SpO2: 99% (06 Apr 2022 19:21) (98% - 100%)    PHYSICAL EXAM:  GENERAL: NAD  HEENT: Normocephalic;  conjunctivae and sclerae clear; moist mucous membranes;   NECK : supple  CHEST/LUNG: Clear to auscultation bilaterally with good air entry   HEART: S1 S2  regular; no murmurs, gallops or rubs  ABDOMEN: Soft, Nontender, Nondistended; Bowel sounds present  EXTREMITIES: no cyanosis; no edema; no calf tenderness  SKIN: warm and dry; no rash             Neurological Exam:  - Mental Status:  AAOx3; speech is fluent with intact naming, repetition, and comprehension  - Cranial Nerves II-XII:    II:  PERRLA; visual fields are full to confrontation  III, IV, VI:  EOMI, no nystagmus  V:  facial sensation is intact in the V1-V3 distribution bilaterally.  VII:  face is symmetric with normal eye closure and smile  VIII:  hearing is intact to finger rub  IX, X:  uvula is midline and soft palate rises symmetrically  XI:  head turning and shoulder shrug are intact bilaterally  XII:  tongue protrudes in the midline  - Motor:  strength is 5/5 throughout; normal muscle bulk and tone throughout; no pronator drift  - Reflexes:  2+ and symmetric at the biceps, triceps, brachioradialis, knees, and ankles;  plantar reflexes downgoing bilaterally  - Sensory:  intact to light touch, pin prick, vibration, and joint-position sense throughout  - Coordination:  finger-nose-finger and heel-knee-shin intact without dysmetria; rapid alternating hand movements intact  - Gait:  normal steps, base, arm swing, and turning; Romberg testing is negative    Other:    04-06    141  |  102  |  12  ----------------------------<  109<H>  3.9   |  25  |  0.86    Ca    10.0      06 Apr 2022 18:38    TPro  7.8  /  Alb  5.1<H>  /  TBili  0.6  /  DBili  x   /  AST  34  /  ALT  45  /  AlkPhos  67  04-06                            16.0   8.55  )-----------( 254      ( 06 Apr 2022 18:38 )             48.1       Radiology    CT:   MRI  EKG:  tele:  TTE:  EEG:              HPI:  41 year old left-handed man with past medical history cervical and lumbar disc herniations, HTN, pre-diabetes presents as CODE STROKE for vertigo starting at 1:00am on 4/6 while awake and right-sided paresthesias of face, arm and leg approx 3 hours later. Symptoms have persisted since then. He describes dizziness as room-spinning with changes in head position. He had recurrence during car ride to the hospital. Patient called his PCP who sent in prescription for meclizine, however due to prominent family history of stroke, patient was worried and came to ED for further evaluation. Patient denies facial droop, blurry vision, double vision, speech disturbances (no dysarthria or word-finding difficulties), gait imbalance, vomiting, headache, weakness, fall, head trauma, fevers, chills. Patient denies sinus headaches, allergies, tinnitus/hearing changes.    LKN 4/5/22 10:00pm, preMRS 0, NIHSS 1. Patient outside window for tPA. CT angio head/neck was negative for LVO, therefore patient was not a candidate for mechanical thrombectomy.    MEDICATIONS  (STANDING):    MEDICATIONS  (PRN):    PAST MEDICAL & SURGICAL HISTORY:  Disc herniations in spine  HTN  Pre-diabetes    FAMILY HISTORY:  Stroke    Allergies  No Known Allergies    SHx - No smoking, No ETOH, No drug abuse    Review of Systems:  CONSTITUTIONAL: No fevers, chills  HEENT:  No visual loss, blurred vision, double vision.  No hearing loss, sneezing, congestion, runny nose or sore throat.  SKIN:  No rash or itching.  CARDIOVASCULAR:  No chest pain, chest pressure or chest discomfort. No palpitations or edema.  RESPIRATORY:  No shortness of breath, cough or sputum.  GASTROINTESTINAL:  No anorexia, nausea, vomiting or diarrhea. No abdominal pain.  GENITOURINARY:  No dysuria. No increased frequency. No retention. No incontinence.  NEUROLOGICAL:  See HPI  MUSCULOSKELETAL:  No muscle, back pain, joint pain or stiffness.  HEMATOLOGIC:  No anemia, bleeding or bruising.    Vital Signs Last 24 Hrs  T(C): 36.7 (06 Apr 2022 19:21), Max: 36.7 (06 Apr 2022 18:35)  T(F): 98 (06 Apr 2022 19:21), Max: 98 (06 Apr 2022 18:35)  HR: 75 (06 Apr 2022 19:21) (75 - 96)  BP: 130/84 (06 Apr 2022 19:21) (130/84 - 161/102)  BP(mean): 122 (06 Apr 2022 18:35) (122 - 122)  RR: 17 (06 Apr 2022 19:21) (17 - 18)  SpO2: 99% (06 Apr 2022 19:21) (98% - 100%)    PHYSICAL EXAM:  GENERAL: NAD  HEENT: Normocephalic; conjunctivae and sclerae clear; moist mucous membranes;   NECK: Supple  EXTREMITIES: No cyanosis; no edema; no calf tenderness  SKIN: Warm and dry; no rash             Neurological Exam:  - Mental Status: Alert, oriented to self, place, time, situation; no dysarthria, speech is fluent with intact naming, repetition, and comprehension; follows crossed commands  - Cranial Nerves II-XII:    II:  PERRLA; visual fields are full to confrontation  III, IV, VI:  EOMI, no nystagmus  V:  facial sensation is diminished in the V1-V3 distribution on right  VII:  face is symmetric with normal eye closure and smile  VIII:  hearing is intact to finger rub  IX, X:  uvula is midline and soft palate rises symmetrically  XI:  head turning and shoulder shrug are intact bilaterally  XII:  tongue protrudes in the midline  - Motor: strength is 5/5 throughout; normal muscle bulk and tone throughout; no pronator drift  - Reflexes:  2+ and symmetric at the biceps, triceps, brachioradialis, knees, and ankles;  plantar reflexes downgoing bilaterally  - Sensory: diminished to light touch for RLE, symmetric for upper extremities  - Coordination:  finger-nose-finger and heel-knee-shin intact without dysmetria; rapid alternating hand movements intact  - Gait:  normal steps, base, arm swing, and turning; Romberg testing is negative    Other:    04-06    141  |  102  |  12  ----------------------------<  109<H>  3.9   |  25  |  0.86    Ca    10.0      06 Apr 2022 18:38    TPro  7.8  /  Alb  5.1<H>  /  TBili  0.6  /  DBili  x   /  AST  34  /  ALT  45  /  AlkPhos  67  04-06                            16.0   8.55  )-----------( 254      ( 06 Apr 2022 18:38 )             48.1       Radiology  CT Brain Stroke Protocol (04.06.22 @ 18:50) >  IMPRESSION:  No evidence of acute intracranial hemorrhage, midline shift, or   hydrocephalus.    CT Angio Head w/ IV Cont (04.06.22 @ 19:25) >    CTA neck: No significant stenosis in association with the bilateral   common carotid arteries, bilateral internal carotid arteries,or   bilateral vertebral arteries.    CTA COW: No significant stenosis involving the arterial vascular   structures at the level of the [place]. No CT evidence of   intracranial aneurysm. If the patient has persistent symptoms,   consideration could be given to brain MRI brain and/or MRA if clinically   warranted and if there are no MRI contraindications.            HPI:  41 year old left-handed man with past medical history cervical and lumbar disc herniations, HTN, pre-diabetes presents as CODE STROKE for sudden-onset vertigo starting at 1:00am on 4/6 and right-sided paresthesias of face, arm and leg approx 3 hours later. Patient reported he was awake at the time and had room-spinning dizziness when changing position. Symptoms have persisted since then. He had recurrence during car ride to the hospital. Patient called his PCP who sent in prescription for meclizine, however due to prominent family history of stroke, patient was worried and came to ED for further evaluation. Patient denies facial droop, blurry vision, double vision, speech disturbances (no dysarthria or word-finding difficulties), gait imbalance, vomiting, headache, weakness, fall, head trauma, fevers, chills. Patient denies sinus headaches, allergies, tinnitus/hearing changes.    LKN 4/6/22 1:00AM, preMRS 0, NIHSS 1. Patient outside window for tPA. CT angio head/neck was negative for LVO, therefore patient was not a candidate for mechanical thrombectomy.    MEDICATIONS  (STANDING):    MEDICATIONS  (PRN):    PAST MEDICAL & SURGICAL HISTORY:  Disc herniations in spine  HTN  Pre-diabetes    FAMILY HISTORY:  Stroke    Allergies  No Known Allergies    SHx - No smoking, No ETOH, No drug abuse    Review of Systems:  CONSTITUTIONAL: No fevers, chills  HEENT:  No visual loss, blurred vision, double vision.  No hearing loss, sneezing, congestion, runny nose or sore throat.  SKIN:  No rash or itching.  CARDIOVASCULAR:  No chest pain, chest pressure or chest discomfort. No palpitations or edema.  RESPIRATORY:  No shortness of breath, cough or sputum.  GASTROINTESTINAL:  No anorexia, nausea, vomiting or diarrhea. No abdominal pain.  GENITOURINARY:  No dysuria. No increased frequency. No retention. No incontinence.  NEUROLOGICAL:  See HPI  MUSCULOSKELETAL:  No muscle, back pain, joint pain or stiffness.  HEMATOLOGIC:  No anemia, bleeding or bruising.    Vital Signs Last 24 Hrs  T(C): 36.7 (06 Apr 2022 19:21), Max: 36.7 (06 Apr 2022 18:35)  T(F): 98 (06 Apr 2022 19:21), Max: 98 (06 Apr 2022 18:35)  HR: 75 (06 Apr 2022 19:21) (75 - 96)  BP: 130/84 (06 Apr 2022 19:21) (130/84 - 161/102)  BP(mean): 122 (06 Apr 2022 18:35) (122 - 122)  RR: 17 (06 Apr 2022 19:21) (17 - 18)  SpO2: 99% (06 Apr 2022 19:21) (98% - 100%)    PHYSICAL EXAM:  GENERAL: NAD  HEENT: Normocephalic; conjunctivae and sclerae clear; moist mucous membranes;   NECK: Supple  EXTREMITIES: No cyanosis; no edema; no calf tenderness  SKIN: Warm and dry; no rash             Neurological Exam:  - Mental Status: Alert, oriented to self, place, time, situation; no dysarthria, speech is fluent with intact naming, repetition, and comprehension; follows crossed commands  - Cranial Nerves II-XII:    II:  PERRLA; visual fields are full to confrontation  III, IV, VI:  EOMI, no nystagmus  V:  facial sensation is diminished in the V1-V3 distribution on right  VII:  face is symmetric with normal eye closure and smile  VIII:  hearing is intact to finger rub  IX, X:  uvula is midline and soft palate rises symmetrically  XI:  head turning and shoulder shrug are intact bilaterally  XII:  tongue protrudes in the midline  - Motor: strength is 5/5 throughout; normal muscle bulk and tone throughout; no pronator drift  - Reflexes:  2+ and symmetric at the biceps, triceps, brachioradialis, knees, and ankles;  plantar reflexes downgoing bilaterally  - Sensory: diminished to light touch for RLE, symmetric for upper extremities  - Coordination:  finger-nose-finger and heel-knee-shin intact without dysmetria; rapid alternating hand movements intact  - Gait:  normal steps, base, arm swing, and turning; Romberg testing is negative    Other:    04-06    141  |  102  |  12  ----------------------------<  109<H>  3.9   |  25  |  0.86    Ca    10.0      06 Apr 2022 18:38    TPro  7.8  /  Alb  5.1<H>  /  TBili  0.6  /  DBili  x   /  AST  34  /  ALT  45  /  AlkPhos  67  04-06                            16.0   8.55  )-----------( 254      ( 06 Apr 2022 18:38 )             48.1       Radiology  CT Brain Stroke Protocol (04.06.22 @ 18:50) >  IMPRESSION:  No evidence of acute intracranial hemorrhage, midline shift, or   hydrocephalus.    CT Angio Head w/ IV Cont (04.06.22 @ 19:25) >    CTA neck: No significant stenosis in association with the bilateral   common carotid arteries, bilateral internal carotid arteries,or   bilateral vertebral arteries.    CTA COW: No significant stenosis involving the arterial vascular   structures at the level of the [place]. No CT evidence of   intracranial aneurysm. If the patient has persistent symptoms,   consideration could be given to brain MRI brain and/or MRA if clinically   warranted and if there are no MRI contraindications.

## 2022-04-06 NOTE — ED ADULT NURSE REASSESSMENT NOTE - NS ED NURSE REASSESS COMMENT FT1
Received report from ANNIE Roach in [place]. Pt A&Ox3. Endorsing dizziness. Denies SOB, CP. Safety and comfort maintained

## 2022-04-06 NOTE — ED PROVIDER NOTE - PROGRESS NOTE DETAILS
Attending MD Austin: neurology consultation note reviewed. Recommendation per the note is for MR brain, I will look to clarify the urgency of thus study (i.e. if patient requires admission for such). If so, patient should be admitted to neurology service. Attending MD Austin: I have clarified recommendations with neurology service. Per discussion with Dr. Taj Floyd (neurology attending), MR brain can be pursued as outpatient and is not urgent. Recommendation remains to continue aspirin for now. Vinicius PGY3 - PT. to f/u w/ Dr. Floyd outpt and obtain MRs on an outpatient basis.  Asa 325 given here, instructed pt. to take asa 81mg daily.  Meclizine rx sent.  Pt. aware and agrees w/ plan.  All other questions and concerns have been addressed.

## 2022-04-06 NOTE — ED PROVIDER NOTE - PATIENT PORTAL LINK FT
You can access the FollowMyHealth Patient Portal offered by [facility] by registering at the following website: [email or web address]. By joining [employer]’s FollowMyHealth portal, you will also be able to view your health information using other applications (apps) compatible with our system.

## 2022-04-06 NOTE — ED PROVIDER NOTE - NSFOLLOWUPINSTRUCTIONS_ED_ALL_ED_FT
You were seen for dizziness.    Your work-up in the Emergency Department did not reveal anything acutely concerning.    You were evaluated by the neurology service, who recommends you can be discharged, but would need follow-up with them.  Please follow-up with Dr. Floyd within one week time:    Dr. Taj Floyd  [address] [place] [address]  [phone]    Take Aspirin 81mg daily.  I have also sent Meclizine for your dizziness.  You may take this as instructed if you have symptoms.  You do not have to take them if you are asymptomatic.    If you have any severe increase in pain, fever, uncontrollable nausea vomiting, inability to tolerate eating and drinking, or any other concerning symptoms, return immediately to the Emergency Department.

## 2022-04-06 NOTE — ED PROVIDER NOTE - ATTENDING CONTRIBUTION TO CARE
Attending MD Austin:  I personally have seen and examined this patient. I have performed a substantive portion of the visit including all aspects of the medical decision making.  Resident note reviewed and agree on plan of care and except where noted.      41M presenting for evaluation of dizziness described as vertigo since 1am the day of presentation, then onset of right face/right arm/right leg paresthesia and possibly numbness (onset 4am). Patient had code stroke activated on arrival, code stroke was cancelled per discussion with stroke resident. Patient exam is grossly nonfocal, motor exam nonfocal. CT/CTA non-actionable. No spontaneous nystagmus noted on exam, steady gait. Vertigo history and exam is benign and suggestive of peripheral vertigo, however with concurrent lateralizing sensory symptoms, will involve neurology for their opinion on whether further neuro-imaging warranted. Meclizine for now for symptomatic care.           *The above represents an initial assessment/impression. Please refer to progress notes for potential changes in patient clinical course* Attending MD Austin:  I personally have seen and examined this patient. I have performed a substantive portion of the visit including all aspects of the medical decision making.  Resident and PA note reviewed and agree on plan of care and except where noted.      41M presenting for evaluation of dizziness described as vertigo since 1am the day of presentation, then onset of right face/right arm/right leg paresthesia and possibly numbness (onset 4am). Patient had code stroke activated on arrival, code stroke was cancelled per discussion with stroke resident. Patient exam is grossly nonfocal, motor exam nonfocal. CT/CTA non-actionable. No spontaneous nystagmus noted on exam, steady gait. Vertigo history and exam is benign and suggestive of peripheral vertigo, however with concurrent lateralizing sensory symptoms, will involve neurology for their opinion on whether further neuro-imaging warranted. Meclizine for now for symptomatic care.         *The above represents an initial assessment/impression. Please refer to progress notes for potential changes in patient clinical course*

## 2022-04-07 VITALS
SYSTOLIC BLOOD PRESSURE: 150 MMHG | HEART RATE: 78 BPM | RESPIRATION RATE: 17 BRPM | OXYGEN SATURATION: 100 % | TEMPERATURE: 98 F | DIASTOLIC BLOOD PRESSURE: 95 MMHG

## 2022-04-07 PROCEDURE — 36415 COLL VENOUS BLD VENIPUNCTURE: CPT

## 2022-04-07 PROCEDURE — 82962 GLUCOSE BLOOD TEST: CPT

## 2022-04-07 PROCEDURE — 70450 CT HEAD/BRAIN W/O DYE: CPT | Mod: MA

## 2022-04-07 PROCEDURE — 85610 PROTHROMBIN TIME: CPT

## 2022-04-07 PROCEDURE — 99285 EMERGENCY DEPT VISIT HI MDM: CPT | Mod: 25

## 2022-04-07 PROCEDURE — 85730 THROMBOPLASTIN TIME PARTIAL: CPT

## 2022-04-07 PROCEDURE — 93005 ELECTROCARDIOGRAM TRACING: CPT

## 2022-04-07 PROCEDURE — 70498 CT ANGIOGRAPHY NECK: CPT | Mod: MA

## 2022-04-07 PROCEDURE — 80053 COMPREHEN METABOLIC PANEL: CPT

## 2022-04-07 PROCEDURE — 85025 COMPLETE CBC W/AUTO DIFF WBC: CPT

## 2022-04-07 PROCEDURE — 70496 CT ANGIOGRAPHY HEAD: CPT | Mod: MA

## 2022-04-07 RX ORDER — MECLIZINE HCL 12.5 MG
1 TABLET ORAL
Qty: 21 | Refills: 0
Start: 2022-04-07 | End: 2022-04-13

## 2022-06-08 ENCOUNTER — TRANSCRIPTION ENCOUNTER (OUTPATIENT)
Age: 42
End: 2022-06-08

## 2022-06-08 ENCOUNTER — NON-APPOINTMENT (OUTPATIENT)
Age: 42
End: 2022-06-08

## 2022-07-11 NOTE — ED ADULT TRIAGE NOTE - BRAND OF COVID-19 VACCINATION
"Subjective   Gonzalo Durham is a 87 y.o. male presenting with chief complaint of:   Chief Complaint   Patient presents with   • Medicare Wellness-subsequent   • Diabetes     Needs foot exam for diabetic shoes   • Follow-up     \"I had my labs\"   • Edema     Ankles are swelling, the left gets worse than the right   • Joint Pain     A lot in the ankles   • Hair/Scalp Problem     \"I break out around the back of my hair\"       History of Present Illness :  With daughter.   Here for review of chronic problems that includes DM and others.  Also yearly medicare wellness exam.    Has multiple chronic problems to consider that might have a bearing on today's issues;  an interval appointment.       Chronic/acute problems reviewed today:   2. Hyperlipidemia LDL goal <70 Chronic/stable.  Tolerated use of Rx with labs showing improved lipid values and tolerant liver labs. No muscle aches unexpected.      3. Primary hypertension Chronic/stable.   Chronic/stable. Stable here past/and occ home blood pressures.  No significant chest pain, SOB, LE edema, orthopnea, near syncope, dizziness/light headness.   Recent Vitals       2/20/2022 4/11/2022 7/11/2022       BP: 118/59 112/62 134/68     Pulse: 60 54 46     Temp: -- -- 97.3 °F (36.3 °C)     Weight: -- 94.8 kg (209 lb) 96.8 kg (213 lb 6.4 oz)     BMI (Calculated): -- 29.2 29.8              4. Chronic diastolic congestive heart failure (HCC) Chronic/stable.  Denies significant sob, orthopnea, leg edema, weight gain.  Aware of influence diet/salt and watching weight at home.       5. Atherosclerosis: CAD, AAA ro chronic/stable various areas of disease.  Sees vascular regularly and no plans for upcoming interventions.  Denies development/change in chest pain, claudication, CVA-TIA symptoms such as (Manifest by slurred speech asymmetry of face dysfunction/weakness of extremities).  Blood thinners noted.      6. Chronic anticoagulation Chronic/stable reason for stopping or use of.  Denies " bleeding issues; especially epistaxis, melena, hematochezia.  Upper arms/others do not significantly bruise easily.  No significant bleeding or falls.      7. Peripheral polyneuropathy chronic slowly worsening lower extremity numbness need discomforts; actually makes it difficult to sleep   8. Controlled type 2 diabetes mellitus with other circulatory complication, without long-term current use of insulin (HCC) Chronic/stable.  No problem/pattern hypoglycemia/hyperglycemia manifest by poly- dypsia, phagia, uria, or sweats, diaphoretic episodes, syncope/near.     9. Seborrhea chronic dry patches and oily hair and itching particularly across the back of his neck where his CPAP machine rubs   10. Stage 3a chronic kidney disease (HCC) Chronic/stable.  No nephrology.  No dysuria.  Previously warned/reminded:   To avoid further kidney function decline  A. Treat any time you think you have infection  B. Stay hydrated (dont get dehydrated); drink at least 60 oz fluid every 24 hr (1800 cc or nearly a 2L)  C. Do not allow any xrays with dye WITHOUT the doctor ordering checking your renal function  D. Do not get new medications without the doctor considering your renal condition  E. Do not use motrin/ibuprofen, alleve/naprosyn and these types of medications     11. Anemia, unspecified type Chronic or past history/stable: This has been present before.    There has been GI evaulation in the past. There is no current melena, hematochezia. It has been benign to date and stable/treating/watching.  Contributing comorbidities to date: benign gi loss, bladder cancer, anticoagulation.     12. Hypoxia#to pulmonary chronic stable at 4 L to maintain oxygen saturation with COPD and others   13. Corn or callus chronic variable rough areas on his feet for which he does good foot care and has no open sores.  Chronically has numbness and pain in his feet   14. Gastroesophageal reflux disease, unspecified whether esophagitis present  Chronic/stable.  Controlled heartburn, reflux without dysphagia, melena.  Rx used, periods not used proven needed with symptoms -currently doing ok.        Has an/another acute issue today: none.    The following portions of the patient's history were reviewed and updated as appropriate: allergies, current medications, past family history, past medical history, past social history, past surgical history and problem list.      Current Outpatient Medications:   •  acetaminophen (TYLENOL) 325 MG tablet, Take 2 tablets by mouth 2 (Two) Times a Day., Disp: 360 tablet, Rfl: 2  •  albuterol sulfate HFA (ProAir HFA) 108 (90 Base) MCG/ACT inhaler, Inhale 2 puffs 4 (Four) Times a Day., Disp: 54 g, Rfl: 3  •  apixaban (Eliquis) 5 MG tablet tablet, Take 1 tablet by mouth 2 (two) times a day., Disp: 180 tablet, Rfl: 3  •  Artificial Tear Solution (Just Tears Eye Drops) solution, Apply 1-2 drops to eye(s) as directed by provider Daily As Needed (dry eyes)., Disp: 45 mL, Rfl: 2  •  ascorbic acid (VITAMIN C) 1000 MG tablet, Take 1 tablet by mouth Daily., Disp: 90 tablet, Rfl: 3  •  aspirin 81 MG EC tablet, Take 81 mg by mouth Daily., Disp: , Rfl:   •  calcium carbonate-vitamin d 600-400 MG-UNIT per tablet, Take 1 tablet by mouth 2 (Two) Times a Day., Disp: 180 tablet, Rfl: 3  •  finasteride (PROSCAR) 5 MG tablet, Take 1 tablet by mouth Daily., Disp: 90 tablet, Rfl: 3  •  gabapentin (NEURONTIN) 600 MG tablet, Take 1 tablet by mouth 3 (Three) Times a Day., Disp: 270 tablet, Rfl: 2  •  glucose blood test strip, Use as instructed to check glucose daily and as needed for highs and lows., Disp: 100 each, Rfl: 3  •  glyburide (DIAbeta) 5 MG tablet, Take 1 tablet by mouth Every Morning AND 0.5 tablets Daily Before Supper., Disp: 135 tablet, Rfl: 3  •  guaiFENesin (Mucinex) 600 MG 12 hr tablet, Take 600 mg by mouth Daily., Disp: , Rfl:   •  isosorbide mononitrate (IMDUR) 30 MG 24 hr tablet, Take 1 tablet by mouth Daily., Disp: 90 tablet,  Rfl: 3  •  Lancets (freestyle) lancets, Use once daily, Disp: 100 each, Rfl: 2  •  loratadine (CLARITIN) 10 MG tablet, Take 1 tablet by mouth Daily., Disp: 90 tablet, Rfl: 3  •  metFORMIN (Glucophage) 500 MG tablet, Take 1 tablet by mouth Every Night., Disp: 90 tablet, Rfl: 3  •  metoprolol tartrate (LOPRESSOR) 25 MG tablet, Take 0.5 tablets by mouth 2 (Two) Times a Day., Disp: 180 tablet, Rfl: 3  •  multivitamin with minerals (Centrum Silver Adult 50+) tablet tablet, Take 1 tablet by mouth Daily., Disp: 90 tablet, Rfl: 3  •  NON FORMULARY, CPAP per mask with 4L oxygen nightly, Disp: , Rfl:   •  O2 (OXYGEN), Inhale 4 L/min Continuous. Per nasal cannula, Disp: , Rfl:   •  pantoprazole (PROTONIX) 40 MG EC tablet, Take 1 tablet by mouth Daily., Disp: 90 tablet, Rfl: 3  •  pravastatin (Pravachol) 80 MG tablet, Take 1 tablet by mouth Daily., Disp: 90 tablet, Rfl: 3  •  Stiolto Respimat 2.5-2.5 MCG/ACT aerosol solution inhaler, Inhale 2 puffs Every Night., Disp: , Rfl:   •  terazosin (HYTRIN) 10 MG capsule, Take 1 capsule by mouth Daily., Disp: 90 capsule, Rfl: 3  •  nitroglycerin (Nitrostat) 0.4 MG SL tablet, Place 1 tablet under the tongue Every 5 (Five) Minutes As Needed for Chest Pain., Disp: 90 tablet, Rfl: 3    No problems with medications.    Allergies   Allergen Reactions   • Symbicort [Budesonide-Formoterol Fumarate] Dizziness     Patient passed out and fell   • Prozac [Fluoxetine Hcl] Mental Status Change     Bad dreams.       Review of Systems  GENERAL:  Active/slower with limits, speed, samni for age and SOB.  Sleep is ok; much better with cpap but interrupted caring for wife.    SKIN:  Ongoing callous of feet; no problems with this/other skin today unless above/below.    ENDO:  No syncope, near or diaphoretic sweaty spells.  BS Ok: without download noted last visit.   HEENT: No head injury, headache.  No vision change.  Same mild hearing loss.  Ears without pain/drainage.  No sore throat.  No significant  nasal/sinus congestion/drainage. No epistaxis.  CHEST: No chest wall tenderness or mass. Stable occ cough without productive without wheeze.  Mild/same SOB; no hemoptysis.  Wears oxygen continous.   CV: No chest pain, palpatations, ankle edema.  GI: No heartburn significant dysphagia.  No abdominal pain, diarrhea, constipation, rectal bleeding, or melena.    :  Voids without dysuria, or incontience to completion.  ORTHO: No painful/swollen joints but various on /off sore.  No change occ/usual sore neck or back.  But no acute neck but above mid back pain without recent injury.   NEURO: No dizziness; recent LUE weakness of extremities.  No change some LE numbness/parethesias.   PSYCH: No memory loss.  Mood good; not that anxious, depressed but/and not suicidal.  Tolerated stress.   Screening:  Mammogram: NA  Bone density: NA  Low dose CT chest: Tobacco-smoker/age 21/1 ppd/dc 1980: (21): NA (had CT angio)  IMPRESSION:  1. Stable 6 mm subpleural right lower lobe nodule. Stable for 3.5 years.  No additional workup needed.  2. Linear atelectasis or scarring in both lower lobes. Calcified  granulomas. Centrilobular emphysema.  3. Linear secretions in the distal trachea.  4. Atheromatous disease of the thoracic aorta and coronary arteries.  Dilated pulmonary arteries.  5. Fatty infiltration of the liver.       GI: Colon-div/[facility]/6.15.17/prn  Prostate: chin confirmed 7.11.22  urology/confirmed 7.8.21  [place]/confirmed 11.22.19  [place] in past   Usual lab order  6m CBC, BMP, A1c  12m CBC, CMP, A1c, TSH, LIPID, PSAs, Vit D     Copy/paste function used for ROS/exam AND each area of these were reviewed, updated, confirmed and supplemented as needed.   Data reviewed:   Recent admit/ER/MD visits: last visit/timeline  Last cardiac testing:   Echo:   Results for orders placed during the hospital encounter of 02/20/22    Adult Stress Echo W/ Cont or Stress Agent if Necessary Per Protocol    Interpretation Summary  · Low risk for  ischemia    Adult Transthoracic Echo Complete w/ Color, Spectral and Contrast if necessary per protocol (08/23/2021 13:22)    · Left ventricular wall thickness is consistent with mild to moderate concentric hypertrophy.  · Left ventricular ejection fraction appears to be 61 - 65%. Left ventricular systolic function is normal.  · Normal global longitudinal LV strain (GLS) = -18%.  · Left ventricular diastolic function was indeterminate.  · Moderate pulmonary hypertension is present.     Radiology considered:   No radiology results for the last 90 days.    Lab Results:  Results for orders placed or performed in visit on 07/06/22   Basic Metabolic Panel    Specimen: Blood   Result Value Ref Range    Glucose 77 65 - 99 mg/dL    BUN 22 8 - 23 mg/dL    Creatinine 1.26 0.76 - 1.27 mg/dL    EGFR Result 54.9 (L) >60.0 mL/min/1.73    BUN/Creatinine Ratio 17.5 7.0 - 25.0    Sodium 140 136 - 145 mmol/L    Potassium 4.7 3.5 - 5.2 mmol/L    Chloride 102 98 - 107 mmol/L    Total CO2 29.7 (H) 22.0 - 29.0 mmol/L    Calcium 9.7 8.6 - 10.5 mg/dL   Hemoglobin A1c    Specimen: Blood   Result Value Ref Range    Hemoglobin A1C 6.00 (H) 4.80 - 5.60 %   Iron    Specimen: Blood   Result Value Ref Range    Iron 112 59 - 158 mcg/dL   Vitamin B12    Specimen: Blood   Result Value Ref Range    Vitamin B-12 557 211 - 946 pg/mL   Folate    Specimen: Blood   Result Value Ref Range    Folate >20.00 4.78 - 24.20 ng/mL   CBC & Differential    Specimen: Blood   Result Value Ref Range    WBC 7.20 3.40 - 10.80 10*3/mm3    RBC 3.89 (L) 4.14 - 5.80 10*6/mm3    Hemoglobin 11.5 (L) 13.0 - 17.7 g/dL    Hematocrit 34.5 (L) 37.5 - 51.0 %    MCV 88.7 79.0 - 97.0 fL    MCH 29.6 26.6 - 33.0 pg    MCHC 33.3 31.5 - 35.7 g/dL    RDW 13.3 12.3 - 15.4 %    Platelets 231 140 - 450 10*3/mm3    Neutrophil Rel % 56.3 42.7 - 76.0 %    Lymphocyte Rel % 22.6 19.6 - 45.3 %    Monocyte Rel % 9.0 5.0 - 12.0 %    Eosinophil Rel % 10.8 (H) 0.3 - 6.2 %    Basophil Rel % 1.0 0.0 -  1.5 %    Neutrophils Absolute 4.05 1.70 - 7.00 10*3/mm3    Lymphocytes Absolute 1.63 0.70 - 3.10 10*3/mm3    Monocytes Absolute 0.65 0.10 - 0.90 10*3/mm3    Eosinophils Absolute 0.78 (H) 0.00 - 0.40 10*3/mm3    Basophils Absolute 0.07 0.00 - 0.20 10*3/mm3    Immature Granulocyte Rel % 0.3 0.0 - 0.5 %    Immature Grans Absolute 0.02 0.00 - 0.05 10*3/mm3    nRBC 0.0 0.0 - 0.2 /100 WBC       A1C:  Lab Results - Last 18 Months   Lab Units 07/06/22  0715 01/05/22  0658 07/06/21  0731   HEMOGLOBIN A1C % 6.00* 6.30* 6.20*     GLUCOSE:  Lab Results - Last 18 Months   Lab Units 07/06/22  0715 02/20/22  0405 01/05/22  0658 07/06/21  0731 02/10/21  2022 02/05/21  1230   GLUCOSE mg/dL 77 112* 73 90 140* 136*     LIPID:  Lab Results - Last 18 Months   Lab Units 01/05/22  0658   CHOLESTEROL mg/dL 150   LDL CHOL mg/dL 94   HDL CHOL mg/dL 37*   TRIGLYCERIDES mg/dL 103     PSA:  Lab Results - Last 18 Months   Lab Units 01/05/22  0658   PSA ng/mL 0.983       CBC:  Lab Results - Last 18 Months   Lab Units 07/06/22  0715 02/20/22  0405 01/05/22  0658 07/06/21  0731 02/10/21  2022 02/05/21  1230   WBC 10*3/mm3 7.20 6.89 7.98 6.61 14.30* 6.51   HEMOGLOBIN g/dL 11.5* 11.6* 12.6* 12.2* 10.4* 12.1*   HEMATOCRIT % 34.5* 37.3* 38.4 38.1 30.5* 38.2   PLATELETS 10*3/mm3 231 213 220 212 152 207   IRON mcg/dL 112  --  60 65  --   --       BMP/CMP:  Lab Results - Last 18 Months   Lab Units 07/06/22  0715 02/20/22  0405 01/05/22  0658 07/06/21  0731 02/10/21  2022 02/05/21  1230 01/18/21  0822   SODIUM mmol/L 140 143 145 145 131* 142  --    POTASSIUM mmol/L 4.7 4.3 4.4 4.6 4.0 4.3  --    CHLORIDE mmol/L 102 105 106 107 96* 104  --    TOTAL CO2 mmol/L 29.7*  --  32.7* 28.6  --   --   --    CO2 mmol/L  --  29.0  --   --  27.0 32.0*  --    GLUCOSE mg/dL 77 112* 73 90 140* 136*  --    BUN mg/dL 22 24* 27* 23 25* 25*  --    CREATININE mg/dL 1.26 1.14 1.28* 1.15 1.31* 1.11 1.30   EGFR IF NONAFRICN AM mL/min/1.73  --  61 53* 60* 52* 63  --    EGFR IF  "AFRICN AM mL/min/1.73  --   --  64 73  --   --   --    EGFR RESULT mL/min/1.73 54.9*  --   --   --   --   --   --    CALCIUM mg/dL 9.7 9.1 9.9 9.7 9.3 9.4  --      HEPATIC:  Lab Results - Last 18 Months   Lab Units 02/20/22  0405 01/05/22  0658 02/10/21  2022   ALT (SGPT) U/L 14 11 12   AST (SGOT) U/L 15 14 13   ALK PHOS U/L 63 58 43     Vit D:  Lab Results - Last 18 Months   Lab Units 01/05/22 0658   VIT D 25 HYDROXY ng/ml 63.3     THYROID:  Lab Results - Last 18 Months   Lab Units 01/05/22 0658   TSH uIU/mL 2.620       Objective   /68 (BP Location: Right arm, Patient Position: Sitting, Cuff Size: Large Adult)   Pulse (!) 46   Temp 97.3 °F (36.3 °C) (Infrared)   Resp 18   Ht 180.3 cm (71\")   Wt 96.8 kg (213 lb 6.4 oz)   SpO2 96% Comment: portable oxygen at 4L per NC  BMI 29.76 kg/m²   Body mass index is 29.76 kg/m².    Recent Vitals       2/20/2022 4/11/2022 7/11/2022       BP: 118/59 112/62 134/68     Pulse: 60 54 46     Temp: -- -- 97.3 °F (36.3 °C)     Weight: -- 94.8 kg (209 lb) 96.8 kg (213 lb 6.4 oz)     BMI (Calculated): -- 29.2 29.8           Physical Exam  GENERAL:  Well nourished/developed in no acute distress.   SKIN: Turgor excellent, without wound, rash, lesion  HEENT: Normal cephalic without trauma.  Pupils equal round reactive to light. Extraocular motions full without nystagmus.    No thyroidmegaly, mass, tenderness, lymphadenopathy and supple.  CV: Regular rhythm.  No murmur, gallop,  edema. Posterior pulses intact.  No carotid bruits.  CHEST: No chest wall tenderness or mass.   LUNGS: Symmetric motion with clear/decreased to auscultation.   ABD: Soft, nontender without mass.   ORTHO: Symmetric extremities without swelling/point tenderness.  Full gross range of motion except hips reduced.  Symmetric LE.  Neg straight leg raising.  Neg Patricks maneuver.  Back is straight/mild lordosis.  Mid back sore touch.   NEURO: CN 2-12 grossly intact.  Symmetric facies. 1/4 x bicep knee equal " reflexes.  UE/LE   3/5 strength throughout except 2/5  L.  Nonfocal use extremities. Speech clear.  Light touch decreased bilateral feet.   PSYCH: Oriented x 3.  Pleasant calm, well kept.  Purposeful/directed conservation with intact short/long gross memory.      Diabetic foot exam:   Left: Filament test reduced   Pulses Dorsalis Pedis:  diminished   Reflexes 1+    Vibratory sensation diminished   Proprioception diminished   Sharp/dull discrimination diminished       Right: Filament test reduced   Pulses Dorsalis Pedis:  diminished  Posterior Tibial:  present   Reflexes 1+    Vibratory sensation diminished   Proprioception diminished   Sharp/dull discrimination diminished    Assessment & Plan     1. Hyperlipidemia, unspecified hyperlipidemia type    2. Hyperlipidemia LDL goal <70    3. Primary hypertension    4. Chronic diastolic congestive heart failure (HCC)    5. Atherosclerosis: CAD, AAA ro    6. Chronic anticoagulation    7. Peripheral polyneuropathy    8. Controlled type 2 diabetes mellitus with other circulatory complication, without long-term current use of insulin (HCC)    9. Seborrhea    10. Stage 3a chronic kidney disease (HCC)    11. Anemia, unspecified type    12. Hypoxia#to pulmonary    13. Corn or callus    14. Gastroesophageal reflux disease, unspecified whether esophagitis present      Face to face for DM shoes/supplies.  He needs to have these shoes to prevent diabetic foot complications. Using these shoes are part of a comprehensive foot care plan ongoing.   Diagnosis for this DM, peripheral neuropathy, feet callous/corns    Discussions/medical decisions/reviews:  BP ok  Other vitals pulse 46 to watch as asymptomatic  DM/BS good  Lipid ok last; LDL 94  PSA ok last  CBC stable Hb 11.5  Renal GFR 54; stable  Liver ok last  Vit D ok last  Thyroid ok last    Watching HR 46 since asymptomatic  hydrocortione lotion for seborrhea/scalp itch prn  Pro/con Norco; as no other good option for neuropathy  and other pains; #12/agreed to try  Controlled substance form completed    Medical decision issues:   Data review above:   Rx: reviewed and decisions:   Visit today involved chronic significant medical problems or differentials and/or intensive drug monitoring: ie potential to cause serious morbidity or death:   Rx changes:   New Medications Ordered This Visit   Medications   • pravastatin (Pravachol) 80 MG tablet     Sig: Take 1 tablet by mouth Daily.     Dispense:  90 tablet     Refill:  3   • HYDROcodone-acetaminophen (Norco) 5-325 MG per tablet     Sig: Take 1 tablet by mouth Every 6 (Six) Hours As Needed for Mild Pain  or Moderate Pain .     Dispense:  12 tablet     Refill:  0   • hydrocortisone 1 % lotion     Sig: Apply  topically to the appropriate area as directed 2 (Two) Times a Day.     Dispense:  59 mL     Refill:  1       Orders placed:   LAB/Testing/Referrals: reviewed/orders:   Today:   Orders Placed This Encounter   Procedures   • Footwear Diabetic     Chronic/recurrent labs above or change to:   Same    Wellness done   Screening reviewed/updated     Immunization History   Administered Date(s) Administered   • COVID-19 (MODERNA) 1st, 2nd, 3rd Dose Only 03/03/2021, 03/31/2021   • FLUAD TRI 65YR+ 11/22/2019   • Fluad Quad 65+ 10/06/2020   • Fluzone High Dose =>65 Years ([employer] ONLY) 10/09/2018   • Fluzone High-Dose 65+yrs 12/27/2021   • Fluzone Split Quad (Multi-dose) 10/21/2015, 01/20/2017, 01/29/2018   • Influenza Whole 10/21/2015   • Pneumococcal Conjugate 13-Valent (PCV13) 10/21/2015     Vaccine reviewed: today none; later we advised/reaffirmed our support/suggestion for staying complete with covid- covid boosters, seasonal flu/yearly and any missing vaccine from list we supplied; we suggest contact with local health department office to review missing/needed vaccines and then bring nursing documentation for these vaccines to this office.     Health maintenance:   Body mass index is 29.76  "kg/m².  BMI is >= 25 and <30. (Overweight) The following options were offered after discussion;: none (medical contraindication)      Tobacco use reviewed:   Gonzalo Durham  reports that he quit smoking about 42 years ago. His smoking use included cigarettes. He started smoking about 68 years ago. He has a 26.00 pack-year smoking history. He has never used smokeless tobacco..     Annual/wellness visit: also/today (see separate note)-medicare     Patient Instructions     Medicare/insurances offer certain visits called \"wellness/annual\" that allows for time to deal with and  review the many aspects of \"being well\" that just might not get mentioned during other visits with your doctor through the year.  This includes things like reviews of health screenings (mammograms, various labs),  weight, exercise, vaccines for just a few examples.      In order to help you with this we wish to make you aware of a few things for you to consider:    1. Advanced directives.  These are documents used to help direct your care if your health/situation should reach a point that you cannot make your own decisions.  While it is likely you do not currently have a need for these documents now; it is something that we all might face at any time.   The hand outs you are being given today are simply for you to review and use to learn more about these documents and consider them as you wish.      2. Vaccines: Certain vaccines are important after age 50, 60, and 65 and some health situations (for example COPD), require even boosters beyond age 65.  We are happy to review with you your vaccine status and vaccines that might be needed for you at this point:      a. Tetanus.   Like anyone this needs to given every 10 years; sooner for/with lacerations/wounds.   Likely when getting this booster it needs to be a tetanus called Tdap (tetanus mixed with diptheria and pertussis).   Years ago you had this vaccine.  We now know we can lose our immunity to " pertussis (a part of this vaccine) and run a risk of catching this.  Now only would this make us ill; but more importantly we can spread this to very young children (and for them it can be a much more dangerous illness).   We call this the grandparent vaccine for this reason.     b. Pneumonia (strept).   This comes now with two brands.   It is recommended to take pneumovax first; and a year later take the cousin prevnar.  Even if you have had these before; we need to review when and your current health situation/s as you may need boosters and even recently the CDC has made recent/new recommendations for pneumovax.      c. Shingles.  You do not want to catch shingles.  Though you will recover from this; the pain associated with shingles can be severe.  Even if you have had the now older zostavax, or have had shingles; it is recommended you still get the Shingrix (the new vaccine just available early 2018 shingles vaccine).  A new shingles vaccine (a shot to lower your chance of catching shingles) is now available (shingrix).  This vaccine is the second vaccine created for this purpose; (we have had zostavax for years).  Shingrix provides a much better and longer immunity for shingles than zostavax.  For this and other reasons Shingrix can be started at age 50.  If you have had zostavax in the past; you can still take Shingrix.      This vaccine is not paid for in a doctor's office by medicare, medicaid and probably most insurances.  Like zostavax; this is covered in drug stores.  This is a vaccine that if you chose to get you need to get at a drug store that gives vaccines (like [facility], [facility] and [facility].      d. Yearly flu vaccine given from September through April each year (there is a special vaccine for those over 65).     e. Travel vaccines:  If you are one to do international travel; be sure and ask us for any particular unusual vaccines you may need.     f.  Miscellaneous:  If you  have certain health situations/disease you may need specific/particular vaccines not give to the general public.     g.  Covid: currently recommended everyone over 5.  The brands Pfizer/Moderna are for 3 total shots as immunity will wane from less than this.  [employer] has a version that comes with recommendations for an initial vaccine and booster after.  I no longer recommend J&J as a first choice as Pfizer/Moderna are readily available.  If you have had an initial J&J I recommend you booster with Moderna.   I strongly recommend covid vaccination; being unvaccinated or partially vaccinated carries real risk for disease and even death.     Because of many restrictions on this office always having all the above vaccines; you may be advised to work with your local health department to keep up with your individual vaccine needs.    The vaccines we have on record for you include:   Immunization History   Administered Date(s) Administered   • COVID-19 (MODERNA) 1st, 2nd, 3rd Dose Only 03/03/2021, 03/31/2021   • FLUAD TRI 65YR+ 11/22/2019   • Fluad Quad 65+ 10/06/2020   • Fluzone High Dose =>65 Years ([facility] ONLY) 10/09/2018   • Fluzone High-Dose 65+yrs 12/27/2021   • Fluzone Split Quad (Multi-dose) 10/21/2015, 01/20/2017, 01/29/2018   • Influenza Whole 10/21/2015   • Pneumococcal Conjugate 13-Valent (PCV13) 10/21/2015       If you have record of other vaccines and want them to show in your chart here; please talk to our nurses about having your vaccine record updated.     3. Exercise: regular cardio exercise something everyone should consider and try to do; even if health limitations (ie find that exercise UE/LE/cardio that they can tolerate).   Normal weight a goal for everyone (as we discussed)    4. Healthy diet helpful for weight management, illness prevention.     5. If over 50-screening exams include men PSA/rectal exam, women mammograms, and everyone colonoscopy screening for colon cancer.    6. If you use  tobacco of any kind or e-products you should stop. We are providing you some information to consider that could make this process easier.      ##################################              Follow up: Return for controlled substance form; lab/Dr Romero 6m.  Future Appointments   Date Time Provider Department Center   7/21/2022  7:00 AM PAD US [facility] CART 3 BH PAD [facility] PAD   7/21/2022  8:00 AM PAD CT 2 BH PAD CAT PAD   7/21/2022  9:00 AM Nasir Gutierrez DO [facility] PAD PAD   8/4/2022  8:30 AM Diallo Hightower APRN [facility] N PAD PAD   10/11/2022  9:15 AM Bradley Carver MD [facility] CD  PAD   12/12/2022  9:40 AM Danie Cadena MD [facility] U PAD PAD   1/16/2023  8:20 AM [facility] PAD   1/18/2023  9:00 AM Abel Romero MD [facility] METR PAD   1/26/2023  9:30 AM Feliz Frye APRN [facility] RD PAD PAD              Moderna dose 1, 2, and 3

## 2022-12-15 ENCOUNTER — NON-APPOINTMENT (OUTPATIENT)
Age: 42
End: 2022-12-15

## 2022-12-20 ENCOUNTER — NON-APPOINTMENT (OUTPATIENT)
Age: 42
End: 2022-12-20

## 2022-12-22 ENCOUNTER — NON-APPOINTMENT (OUTPATIENT)
Age: 42
End: 2022-12-22

## 2023-06-07 ENCOUNTER — APPOINTMENT (OUTPATIENT)
Dept: INTERNAL MEDICINE | Facility: CLINIC | Age: 43
End: 2023-06-07
Payer: COMMERCIAL

## 2023-06-07 ENCOUNTER — NON-APPOINTMENT (OUTPATIENT)
Age: 43
End: 2023-06-07

## 2023-06-07 VITALS
SYSTOLIC BLOOD PRESSURE: 130 MMHG | TEMPERATURE: 98.2 F | BODY MASS INDEX: 29.58 KG/M2 | OXYGEN SATURATION: 99 % | DIASTOLIC BLOOD PRESSURE: 80 MMHG | WEIGHT: 195.2 LBS | HEART RATE: 96 BPM | HEIGHT: 68 IN | RESPIRATION RATE: 18 BRPM

## 2023-06-07 DIAGNOSIS — R73.03 PREDIABETES.: ICD-10-CM

## 2023-06-07 DIAGNOSIS — M54.9 DORSALGIA, UNSPECIFIED: ICD-10-CM

## 2023-06-07 DIAGNOSIS — Z00.00 ENCOUNTER FOR GENERAL ADULT MEDICAL EXAMINATION W/OUT ABNORMAL FINDINGS: ICD-10-CM

## 2023-06-07 DIAGNOSIS — M54.16 RADICULOPATHY, LUMBAR REGION: ICD-10-CM

## 2023-06-07 DIAGNOSIS — G89.29 DORSALGIA, UNSPECIFIED: ICD-10-CM

## 2023-06-07 PROCEDURE — 99396 PREV VISIT EST AGE 40-64: CPT | Mod: 25

## 2023-06-07 PROCEDURE — 36415 COLL VENOUS BLD VENIPUNCTURE: CPT

## 2023-06-07 RX ORDER — CYCLOBENZAPRINE HYDROCHLORIDE 5 MG/1
5 TABLET, FILM COATED ORAL
Qty: 30 | Refills: 1 | Status: ACTIVE | COMMUNITY
Start: 2021-02-16 | End: 1900-01-01

## 2023-06-07 RX ORDER — TRAMADOL HYDROCHLORIDE 50 MG/1
50 TABLET, COATED ORAL
Qty: 21 | Refills: 0 | Status: ACTIVE | COMMUNITY
Start: 2021-02-16 | End: 1900-01-01

## 2023-06-12 ENCOUNTER — OUTPATIENT (OUTPATIENT)
Dept: OUTPATIENT SERVICES | Facility: HOSPITAL | Age: 43
LOS: 1 days | End: 2023-06-12
Payer: COMMERCIAL

## 2023-06-12 ENCOUNTER — APPOINTMENT (OUTPATIENT)
Dept: MRI IMAGING | Facility: CLINIC | Age: 43
End: 2023-06-12
Payer: COMMERCIAL

## 2023-06-12 ENCOUNTER — APPOINTMENT (OUTPATIENT)
Dept: ORTHOPEDIC SURGERY | Facility: CLINIC | Age: 43
End: 2023-06-12
Payer: COMMERCIAL

## 2023-06-12 VITALS — BODY MASS INDEX: 29.55 KG/M2 | WEIGHT: 195 LBS | HEIGHT: 68 IN

## 2023-06-12 DIAGNOSIS — M51.26 OTHER INTERVERTEBRAL DISC DISPLACEMENT, LUMBAR REGION: ICD-10-CM

## 2023-06-12 PROCEDURE — 72100 X-RAY EXAM L-S SPINE 2/3 VWS: CPT

## 2023-06-12 PROCEDURE — 72148 MRI LUMBAR SPINE W/O DYE: CPT | Mod: 26

## 2023-06-12 PROCEDURE — 99215 OFFICE O/P EST HI 40 MIN: CPT

## 2023-06-12 PROCEDURE — 72148 MRI LUMBAR SPINE W/O DYE: CPT

## 2023-06-12 RX ORDER — MELOXICAM 15 MG/1
15 TABLET ORAL
Qty: 30 | Refills: 1 | Status: ACTIVE | COMMUNITY
Start: 2023-06-12 | End: 1900-01-01

## 2023-06-21 ENCOUNTER — APPOINTMENT (OUTPATIENT)
Dept: ORTHOPEDIC SURGERY | Facility: CLINIC | Age: 43
End: 2023-06-21
Payer: COMMERCIAL

## 2023-06-21 PROCEDURE — 99214 OFFICE O/P EST MOD 30 MIN: CPT | Mod: 95

## 2023-07-07 ENCOUNTER — TRANSCRIPTION ENCOUNTER (OUTPATIENT)
Age: 43
End: 2023-07-07

## 2023-07-19 ENCOUNTER — APPOINTMENT (OUTPATIENT)
Dept: ORTHOPEDIC SURGERY | Facility: CLINIC | Age: 43
End: 2023-07-19
Payer: COMMERCIAL

## 2023-07-19 VITALS — HEIGHT: 68 IN | BODY MASS INDEX: 29.55 KG/M2 | WEIGHT: 195 LBS

## 2023-07-19 VITALS — WEIGHT: 195 LBS | HEIGHT: 68 IN | BODY MASS INDEX: 29.55 KG/M2

## 2023-07-19 PROCEDURE — 99214 OFFICE O/P EST MOD 30 MIN: CPT

## 2023-07-27 PROBLEM — M54.9 CHRONIC BACK PAIN: Status: ACTIVE | Noted: 2021-02-05

## 2023-07-27 PROBLEM — Z00.00 GENERAL MEDICAL EXAM: Status: ACTIVE | Noted: 2023-07-27

## 2023-07-27 PROBLEM — M54.16 ACUTE LUMBAR RADICULOPATHY: Status: ACTIVE | Noted: 2021-03-05

## 2023-07-27 LAB
25(OH)D3 SERPL-MCNC: 37.9 NG/ML
ALBUMIN SERPL ELPH-MCNC: 4.8 G/DL
ALP BLD-CCNC: 59 U/L
ALT SERPL-CCNC: 39 U/L
ANION GAP SERPL CALC-SCNC: 11 MMOL/L
APPEARANCE: CLEAR
AST SERPL-CCNC: 30 U/L
BACTERIA: NEGATIVE /HPF
BILIRUB SERPL-MCNC: 0.6 MG/DL
BILIRUBIN URINE: NEGATIVE
BLOOD URINE: NEGATIVE
BUN SERPL-MCNC: 12 MG/DL
CALCIUM SERPL-MCNC: 9.7 MG/DL
CAST: 0 /LPF
CHLORIDE SERPL-SCNC: 105 MMOL/L
CHOLEST SERPL-MCNC: 190 MG/DL
CO2 SERPL-SCNC: 25 MMOL/L
COLOR: YELLOW
CREAT SERPL-MCNC: 0.88 MG/DL
EGFR: 109 ML/MIN/1.73M2
EPITHELIAL CELLS: 0 /HPF
ESTIMATED AVERAGE GLUCOSE: 108 MG/DL
GLUCOSE QUALITATIVE U: NEGATIVE MG/DL
GLUCOSE SERPL-MCNC: 97 MG/DL
HBA1C MFR BLD HPLC: 5.4 %
HDLC SERPL-MCNC: 54 MG/DL
KETONES URINE: NEGATIVE MG/DL
LDLC SERPL CALC-MCNC: 120 MG/DL
LEUKOCYTE ESTERASE URINE: NEGATIVE
MICROSCOPIC-UA: NORMAL
NITRITE URINE: NEGATIVE
NONHDLC SERPL-MCNC: 137 MG/DL
PH URINE: 7
POTASSIUM SERPL-SCNC: 4.1 MMOL/L
PROT SERPL-MCNC: 7.4 G/DL
PROTEIN URINE: NEGATIVE MG/DL
PSA FREE FLD-MCNC: 47 %
PSA FREE SERPL-MCNC: 0.47 NG/ML
PSA SERPL-MCNC: 1 NG/ML
RED BLOOD CELLS URINE: 1 /HPF
SODIUM SERPL-SCNC: 141 MMOL/L
SPECIFIC GRAVITY URINE: 1.02
TRIGL SERPL-MCNC: 85 MG/DL
TSH SERPL-ACNC: 1.4 UIU/ML
UROBILINOGEN URINE: 0.2 MG/DL
WHITE BLOOD CELLS URINE: 0 /HPF

## 2023-07-27 RX ORDER — DICLOFENAC POTASSIUM 50 MG/1
50 TABLET, COATED ORAL 3 TIMES DAILY
Qty: 30 | Refills: 1 | Status: ACTIVE | COMMUNITY
Start: 2023-06-07

## 2023-07-27 NOTE — ASSESSMENT
[FreeTextEntry1] : -Severe acute on chronic lower back pain w/ radiation to b/l legs\par -Start muscle relaxer, NSAIDs, Tramadol PRN for breakthrough pain, short course steroids\par -Pt reports steroids helped in the past\par -F/up orthopedics - may need another epidural/injection - referral provided \par \par -Routine labs drawn today

## 2023-07-27 NOTE — HISTORY OF PRESENT ILLNESS
[FreeTextEntry1] : CPE\par  [de-identified] : Mr. RODRIGUEZ is a 42 year old male that presents to the office for a CPE. The patient has a pmhx of spinal disk disease s/p epidural injection, prediabetes, hypercholesterolemia. The patient reports bilateral lower back pain radiating to his legs. \par \par Pt last worked Monday- barely made it through shift due to discomfort\par B/l back pain into legs,urinary urgency\par Dr. Foy, s/p epidural, out of work for 8 months, last saw in 2021\par Pt has seen chiropractor with mild-moderate relief\par \par Prior MRI: right-sided foraminal/paracentral disc herniation at L3-L4\par S/P L3-L4 translaminar epidural steroid injection\par \par

## 2023-07-27 NOTE — HEALTH RISK ASSESSMENT
[Good] : ~his/her~  mood as  good [Yes] : Yes [Monthly or less (1 pt)] : Monthly or less (1 point) [1 or 2 (0 pts)] : 1 or 2 (0 points) [Never (0 pts)] : Never (0 points) [No] : In the past 12 months have you used drugs other than those required for medical reasons? No [No falls in past year] : Patient reported no falls in the past year [0] : 2) Feeling down, depressed, or hopeless: Not at all (0) [PHQ-2 Negative - No further assessment needed] : PHQ-2 Negative - No further assessment needed [None] : None [With Family] : lives with family [Employed] : employed [Single] : single [Feels Safe at Home] : Feels safe at home [Fully functional (bathing, dressing, toileting, transferring, walking, feeding)] : Fully functional (bathing, dressing, toileting, transferring, walking, feeding) [Fully functional (using the telephone, shopping, preparing meals, housekeeping, doing laundry, using] : Fully functional and needs no help or supervision to perform IADLs (using the telephone, shopping, preparing meals, housekeeping, doing laundry, using transportation, managing medications and managing finances) [Smoke Detector] : smoke detector [Carbon Monoxide Detector] : carbon monoxide detector [Patient/Caregiver not ready to engage] : , patient/caregiver not ready to engage [Never] : Never [Audit-CScore] : 1 [de-identified] : walking [de-identified] : balanced [[id number]] : 0

## 2023-07-27 NOTE — PHYSICAL EXAM
[No Acute Distress] : no acute distress [Well Nourished] : well nourished [Well Developed] : well developed [Well-Appearing] : well-appearing [Normal Sclera/Conjunctiva] : normal sclera/conjunctiva [PERRL] : pupils equal round and reactive to light [EOMI] : extraocular movements intact [Normal Outer Ear/Nose] : the outer ears and nose were normal in appearance [Normal Oropharynx] : the oropharynx was normal [No JVD] : no jugular venous distention [No Lymphadenopathy] : no lymphadenopathy [Supple] : supple [Thyroid Normal, No Nodules] : the thyroid was normal and there were no nodules present [No Respiratory Distress] : no respiratory distress  [No Accessory Muscle Use] : no accessory muscle use [Clear to Auscultation] : lungs were clear to auscultation bilaterally [Normal Rate] : normal rate  [Regular Rhythm] : with a regular rhythm [Normal S1, S2] : normal S1 and S2 [No Murmur] : no murmur heard [No Carotid Bruits] : no carotid bruits [No Abdominal Bruit] : a ~M bruit was not heard ~T in the abdomen [No Varicosities] : no varicosities [Pedal Pulses Present] : the pedal pulses are present [No Edema] : there was no peripheral edema [No Palpable Aorta] : no palpable aorta [No Extremity Clubbing/Cyanosis] : no extremity clubbing/cyanosis [Soft] : abdomen soft [Non Tender] : non-tender [Non-distended] : non-distended [No Masses] : no abdominal mass palpated [No HSM] : no HSM [Normal Bowel Sounds] : normal bowel sounds [Normal Posterior Cervical Nodes] : no posterior cervical lymphadenopathy [Normal Anterior Cervical Nodes] : no anterior cervical lymphadenopathy [No CVA Tenderness] : no CVA  tenderness [No Joint Swelling] : no joint swelling [Grossly Normal Strength/Tone] : grossly normal strength/tone [No Rash] : no rash [Coordination Grossly Intact] : coordination grossly intact [No Focal Deficits] : no focal deficits [Normal Gait] : normal gait [Deep Tendon Reflexes (DTR)] : deep tendon reflexes were 2+ and symmetric [Normal Affect] : the affect was normal [Normal Insight/Judgement] : insight and judgment were intact [de-identified] : decreased ROM, limited mobility

## 2023-08-16 ENCOUNTER — APPOINTMENT (OUTPATIENT)
Dept: ORTHOPEDIC SURGERY | Facility: CLINIC | Age: 43
End: 2023-08-16
Payer: COMMERCIAL

## 2023-08-16 VITALS — WEIGHT: 195 LBS | HEIGHT: 68 IN | BODY MASS INDEX: 29.55 KG/M2

## 2023-08-16 PROCEDURE — 99214 OFFICE O/P EST MOD 30 MIN: CPT

## 2023-10-09 ENCOUNTER — TRANSCRIPTION ENCOUNTER (OUTPATIENT)
Age: 43
End: 2023-10-09

## 2023-11-16 ENCOUNTER — APPOINTMENT (OUTPATIENT)
Dept: INTERNAL MEDICINE | Facility: CLINIC | Age: 43
End: 2023-11-16
Payer: COMMERCIAL

## 2023-11-16 VITALS
OXYGEN SATURATION: 98 % | TEMPERATURE: 97.5 F | WEIGHT: 200 LBS | HEART RATE: 88 BPM | DIASTOLIC BLOOD PRESSURE: 80 MMHG | SYSTOLIC BLOOD PRESSURE: 128 MMHG | BODY MASS INDEX: 30.31 KG/M2 | HEIGHT: 68 IN

## 2023-11-16 DIAGNOSIS — J06.9 ACUTE UPPER RESPIRATORY INFECTION, UNSPECIFIED: ICD-10-CM

## 2023-11-16 PROCEDURE — 99213 OFFICE O/P EST LOW 20 MIN: CPT | Mod: 25

## 2023-11-16 RX ORDER — OXYMETAZOLINE HCL/MENTHOL 0.05 %
0.05 AEROSOL, SPRAY (ML) NASAL TWICE DAILY
Qty: 1 | Refills: 0 | Status: ACTIVE | COMMUNITY
Start: 2023-11-16 | End: 1900-01-01

## 2023-11-16 RX ORDER — NON-ADHERENT BANDAGE 3"X4"
0.65 BANDAGE TOPICAL TWICE DAILY
Qty: 1 | Refills: 0 | Status: ACTIVE | COMMUNITY
Start: 2023-11-16 | End: 1900-01-01

## 2023-11-16 RX ORDER — BENZONATATE 100 MG/1
100 CAPSULE ORAL 3 TIMES DAILY
Qty: 1 | Refills: 0 | Status: ACTIVE | COMMUNITY
Start: 2023-11-16 | End: 1900-01-01

## 2023-11-17 LAB
RAPID RVP RESULT: NOT DETECTED
SARS-COV-2 RNA PNL RESP NAA+PROBE: NOT DETECTED

## 2023-11-21 DIAGNOSIS — J20.9 ACUTE BRONCHITIS, UNSPECIFIED: ICD-10-CM

## 2023-11-21 RX ORDER — ALBUTEROL SULFATE 90 UG/1
108 (90 BASE) AEROSOL, METERED RESPIRATORY (INHALATION)
Qty: 1 | Refills: 0 | Status: ACTIVE | COMMUNITY
Start: 2023-11-21 | End: 1900-01-01

## 2023-11-28 ENCOUNTER — APPOINTMENT (OUTPATIENT)
Dept: ORTHOPEDIC SURGERY | Facility: CLINIC | Age: 43
End: 2023-11-28
Payer: COMMERCIAL

## 2023-11-28 VITALS — WEIGHT: 200 LBS | HEIGHT: 68 IN | BODY MASS INDEX: 30.31 KG/M2

## 2023-11-28 DIAGNOSIS — M51.26 OTHER INTERVERTEBRAL DISC DISPLACEMENT, LUMBAR REGION: ICD-10-CM

## 2023-11-28 DIAGNOSIS — M51.36 OTHER INTERVERTEBRAL DISC DEGENERATION, LUMBAR REGION: ICD-10-CM

## 2023-11-28 DIAGNOSIS — M48.07 SPINAL STENOSIS, LUMBOSACRAL REGION: ICD-10-CM

## 2023-11-28 PROCEDURE — 99214 OFFICE O/P EST MOD 30 MIN: CPT

## 2023-12-27 ENCOUNTER — APPOINTMENT (OUTPATIENT)
Dept: INTERNAL MEDICINE | Facility: CLINIC | Age: 43
End: 2023-12-27
Payer: COMMERCIAL

## 2023-12-27 VITALS
TEMPERATURE: 97.4 F | SYSTOLIC BLOOD PRESSURE: 128 MMHG | HEART RATE: 82 BPM | OXYGEN SATURATION: 98 % | WEIGHT: 205 LBS | BODY MASS INDEX: 31.07 KG/M2 | HEIGHT: 68 IN | DIASTOLIC BLOOD PRESSURE: 72 MMHG

## 2023-12-27 PROCEDURE — 99213 OFFICE O/P EST LOW 20 MIN: CPT | Mod: 25

## 2024-01-03 NOTE — HISTORY OF PRESENT ILLNESS
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

## 2024-01-03 NOTE — ASSESSMENT
[FreeTextEntry1] : Recent URI Symptoms resolved Pt is currently asymptomatic, denies any active complains  Pt is medically clear to go back to work without any restriction

## 2024-01-21 ENCOUNTER — NON-APPOINTMENT (OUTPATIENT)
Age: 44
End: 2024-01-21

## 2024-01-24 ENCOUNTER — APPOINTMENT (OUTPATIENT)
Dept: INTERNAL MEDICINE | Facility: CLINIC | Age: 44
End: 2024-01-24
Payer: COMMERCIAL

## 2024-01-24 ENCOUNTER — NON-APPOINTMENT (OUTPATIENT)
Age: 44
End: 2024-01-24

## 2024-01-24 VITALS
HEART RATE: 88 BPM | SYSTOLIC BLOOD PRESSURE: 142 MMHG | TEMPERATURE: 98.3 F | DIASTOLIC BLOOD PRESSURE: 98 MMHG | OXYGEN SATURATION: 98 % | BODY MASS INDEX: 31.22 KG/M2 | WEIGHT: 206 LBS | HEIGHT: 68 IN

## 2024-01-24 DIAGNOSIS — R06.2 WHEEZING: ICD-10-CM

## 2024-01-24 DIAGNOSIS — J22 UNSPECIFIED ACUTE LOWER RESPIRATORY INFECTION: ICD-10-CM

## 2024-01-24 PROCEDURE — 99214 OFFICE O/P EST MOD 30 MIN: CPT

## 2024-01-24 RX ORDER — AZITHROMYCIN 250 MG/1
250 TABLET, FILM COATED ORAL
Qty: 1 | Refills: 0 | Status: ACTIVE | COMMUNITY
Start: 2024-01-24 | End: 1900-01-01

## 2024-01-24 RX ORDER — PREDNISONE 10 MG/1
10 TABLET ORAL
Qty: 39 | Refills: 0 | Status: DISCONTINUED | COMMUNITY
Start: 2021-02-19 | End: 2024-01-24

## 2024-01-24 RX ORDER — BUDESONIDE AND FORMOTEROL FUMARATE DIHYDRATE 160; 4.5 UG/1; UG/1
160-4.5 AEROSOL RESPIRATORY (INHALATION) TWICE DAILY
Qty: 1 | Refills: 0 | Status: ACTIVE | COMMUNITY
Start: 2023-11-21 | End: 1900-01-01

## 2024-01-24 RX ORDER — AMOXICILLIN AND CLAVULANATE POTASSIUM 875; 125 MG/1; MG/1
875-125 TABLET, COATED ORAL
Qty: 14 | Refills: 0 | Status: DISCONTINUED | COMMUNITY
Start: 2023-11-21 | End: 2024-01-24

## 2024-01-24 RX ORDER — PREDNISONE 20 MG/1
20 TABLET ORAL DAILY
Qty: 10 | Refills: 0 | Status: ACTIVE | COMMUNITY
Start: 2024-01-24 | End: 1900-01-01

## 2024-01-24 RX ADMIN — LEVALBUTEROL HYDROCHLORIDE 0 MG/3ML: 1.25 SOLUTION RESPIRATORY (INHALATION) at 00:00

## 2024-01-24 RX ADMIN — METHYLPREDNISOLONE ACETATE 1 MG/ML: 40 INJECTION, SUSPENSION INTRA-ARTICULAR; INTRALESIONAL; INTRAMUSCULAR; SOFT TISSUE at 00:00

## 2024-01-30 LAB
RAPID RVP RESULT: DETECTED
RV+EV RNA SPEC QL NAA+PROBE: DETECTED
SARS-COV-2 RNA PNL RESP NAA+PROBE: NOT DETECTED

## 2024-02-13 NOTE — REVIEW OF SYSTEMS
[Postnasal Drip] : postnasal drip [Nasal Discharge] : nasal discharge [Wheezing] : wheezing [Cough] : cough [Negative] : Heme/Lymph

## 2024-02-13 NOTE — PLAN
[FreeTextEntry1] :  Increase prednisone to 40mg qd Start Azithromycin Continue Benzonatate PRN Start Symbicort due to active wheeze

## 2024-02-13 NOTE — HISTORY OF PRESENT ILLNESS
[FreeTextEntry8] : Mr. RODRIGUEZ is a 43 year old male who presents to the office for an acute visit due to a complaint of: Persistent cough, chest congestion, cough, wheeze, rhinorrhea, headache   Pt was sick in November - Sinusitis Pt went to  - had wheezing - s/p 2 rounds of nebulizer use  Prescribed albuterol inhaler, tessalon perles, flonase, 20mg Prednisone x5 days - pt on day 2 of prednisone Using albuterol w/ some relief  No fever, chills Obtain RVP  Pt w/ active wheeze in office - will administer Methylprednisolone IM injection, Levalbuterol nebulizer  Increase prednisone to 40mg qd Start Azithromycin Continue Benzonatate PRN Start Symbicort due to active wheeze

## 2024-02-13 NOTE — PHYSICAL EXAM
[No Acute Distress] : no acute distress [Well Nourished] : well nourished [Well Developed] : well developed [Well-Appearing] : well-appearing [Normal Sclera/Conjunctiva] : normal sclera/conjunctiva [EOMI] : extraocular movements intact [PERRL] : pupils equal round and reactive to light [Normal Outer Ear/Nose] : the outer ears and nose were normal in appearance [Normal Oropharynx] : the oropharynx was normal [No JVD] : no jugular venous distention [No Lymphadenopathy] : no lymphadenopathy [Supple] : supple [Thyroid Normal, No Nodules] : the thyroid was normal and there were no nodules present [No Accessory Muscle Use] : no accessory muscle use [No Respiratory Distress] : no respiratory distress  [Normal Rate] : normal rate  [Regular Rhythm] : with a regular rhythm [Normal S1, S2] : normal S1 and S2 [No Murmur] : no murmur heard [No Carotid Bruits] : no carotid bruits [No Abdominal Bruit] : a ~M bruit was not heard ~T in the abdomen [No Varicosities] : no varicosities [Pedal Pulses Present] : the pedal pulses are present [No Edema] : there was no peripheral edema [No Palpable Aorta] : no palpable aorta [No Extremity Clubbing/Cyanosis] : no extremity clubbing/cyanosis [Soft] : abdomen soft [Non Tender] : non-tender [Non-distended] : non-distended [No Masses] : no abdominal mass palpated [No HSM] : no HSM [Normal Bowel Sounds] : normal bowel sounds [Normal Posterior Cervical Nodes] : no posterior cervical lymphadenopathy [Normal Anterior Cervical Nodes] : no anterior cervical lymphadenopathy [No CVA Tenderness] : no CVA  tenderness [No Spinal Tenderness] : no spinal tenderness [No Joint Swelling] : no joint swelling [Grossly Normal Strength/Tone] : grossly normal strength/tone [No Rash] : no rash [Coordination Grossly Intact] : coordination grossly intact [No Focal Deficits] : no focal deficits [Normal Gait] : normal gait [Deep Tendon Reflexes (DTR)] : deep tendon reflexes were 2+ and symmetric [Normal Affect] : the affect was normal [Normal Insight/Judgement] : insight and judgment were intact [de-identified] : wheeze b/l

## 2024-12-16 ENCOUNTER — NON-APPOINTMENT (OUTPATIENT)
Age: 44
End: 2024-12-16

## 2024-12-20 ENCOUNTER — APPOINTMENT (OUTPATIENT)
Facility: CLINIC | Age: 44
End: 2024-12-20
Payer: COMMERCIAL

## 2024-12-20 VITALS
BODY MASS INDEX: 29.25 KG/M2 | TEMPERATURE: 97.9 F | HEIGHT: 68 IN | SYSTOLIC BLOOD PRESSURE: 130 MMHG | HEART RATE: 88 BPM | WEIGHT: 193 LBS | DIASTOLIC BLOOD PRESSURE: 90 MMHG | OXYGEN SATURATION: 99 %

## 2024-12-20 DIAGNOSIS — J32.9 CHRONIC SINUSITIS, UNSPECIFIED: ICD-10-CM

## 2024-12-20 PROCEDURE — 99204 OFFICE O/P NEW MOD 45 MIN: CPT

## 2024-12-20 RX ORDER — AMOXICILLIN AND CLAVULANATE POTASSIUM 500; 125 MG/1; MG/1
500-125 TABLET, FILM COATED ORAL TWICE DAILY
Qty: 14 | Refills: 0 | Status: ACTIVE | COMMUNITY
Start: 2024-12-20 | End: 1900-01-01

## 2024-12-20 RX ORDER — LORATADINE 5 MG/5 ML
0.05 SOLUTION, ORAL ORAL
Qty: 1 | Refills: 0 | Status: ACTIVE | COMMUNITY
Start: 2024-12-20 | End: 1900-01-01

## 2025-05-07 ENCOUNTER — RESULT CHARGE (OUTPATIENT)
Age: 45
End: 2025-05-07

## 2025-05-07 ENCOUNTER — APPOINTMENT (OUTPATIENT)
Dept: INTERNAL MEDICINE | Facility: CLINIC | Age: 45
End: 2025-05-07
Payer: COMMERCIAL

## 2025-05-07 VITALS
RESPIRATION RATE: 16 BRPM | TEMPERATURE: 99 F | OXYGEN SATURATION: 99 % | HEART RATE: 96 BPM | WEIGHT: 202 LBS | DIASTOLIC BLOOD PRESSURE: 88 MMHG | SYSTOLIC BLOOD PRESSURE: 128 MMHG | BODY MASS INDEX: 30.62 KG/M2 | HEIGHT: 68 IN

## 2025-05-07 DIAGNOSIS — J02.9 ACUTE PHARYNGITIS, UNSPECIFIED: ICD-10-CM

## 2025-05-07 LAB — S PYO AG SPEC QL IA: NEGATIVE

## 2025-05-07 PROCEDURE — 99214 OFFICE O/P EST MOD 30 MIN: CPT

## 2025-05-07 PROCEDURE — 87880 STREP A ASSAY W/OPTIC: CPT | Mod: QW

## 2025-05-07 PROCEDURE — G2211 COMPLEX E/M VISIT ADD ON: CPT

## 2025-05-07 RX ORDER — AMOXICILLIN AND CLAVULANATE POTASSIUM 875; 125 MG/1; MG/1
875-125 TABLET, COATED ORAL
Qty: 14 | Refills: 0 | Status: ACTIVE | COMMUNITY
Start: 2025-05-07 | End: 1900-01-01

## 2025-05-08 ENCOUNTER — TRANSCRIPTION ENCOUNTER (OUTPATIENT)
Age: 45
End: 2025-05-08

## 2025-05-08 LAB
INFLUENZA A RESULT: NOT DETECTED
INFLUENZA B RESULT: NOT DETECTED
RESP SYN VIRUS RESULT: NOT DETECTED
SARS-COV-2 RESULT: NOT DETECTED

## 2025-06-13 ENCOUNTER — NON-APPOINTMENT (OUTPATIENT)
Age: 45
End: 2025-06-13

## 2025-06-13 ENCOUNTER — APPOINTMENT (OUTPATIENT)
Dept: INTERNAL MEDICINE | Facility: CLINIC | Age: 45
End: 2025-06-13
Payer: COMMERCIAL

## 2025-06-13 VITALS
DIASTOLIC BLOOD PRESSURE: 82 MMHG | BODY MASS INDEX: 30.62 KG/M2 | TEMPERATURE: 98 F | HEART RATE: 82 BPM | OXYGEN SATURATION: 98 % | WEIGHT: 202 LBS | SYSTOLIC BLOOD PRESSURE: 120 MMHG | HEIGHT: 68 IN

## 2025-06-13 PROCEDURE — 82270 OCCULT BLOOD FECES: CPT

## 2025-06-13 PROCEDURE — 93000 ELECTROCARDIOGRAM COMPLETE: CPT

## 2025-06-13 PROCEDURE — 36415 COLL VENOUS BLD VENIPUNCTURE: CPT

## 2025-06-13 PROCEDURE — 99396 PREV VISIT EST AGE 40-64: CPT

## 2025-06-13 RX ORDER — MULTIVITAMIN
TABLET ORAL
Refills: 0 | Status: ACTIVE | COMMUNITY

## 2025-06-14 LAB
25(OH)D3 SERPL-MCNC: 38.7 NG/ML
ALBUMIN SERPL ELPH-MCNC: 4.8 G/DL
ALP BLD-CCNC: 61 U/L
ALT SERPL-CCNC: 35 U/L
ANION GAP SERPL CALC-SCNC: 16 MMOL/L
APPEARANCE: CLEAR
AST SERPL-CCNC: 33 U/L
BACTERIA: NEGATIVE /HPF
BASOPHILS # BLD AUTO: 0.08 K/UL
BASOPHILS NFR BLD AUTO: 1.1 %
BILIRUB SERPL-MCNC: 0.8 MG/DL
BILIRUBIN URINE: NEGATIVE
BLOOD URINE: NEGATIVE
BUN SERPL-MCNC: 21 MG/DL
CALCIUM SERPL-MCNC: 9.8 MG/DL
CAST: 0 /LPF
CHLORIDE SERPL-SCNC: 105 MMOL/L
CHOLEST SERPL-MCNC: 225 MG/DL
CO2 SERPL-SCNC: 19 MMOL/L
COLOR: YELLOW
CREAT SERPL-MCNC: 0.96 MG/DL
EGFRCR SERPLBLD CKD-EPI 2021: 99 ML/MIN/1.73M2
EOSINOPHIL # BLD AUTO: 0.18 K/UL
EOSINOPHIL NFR BLD AUTO: 2.6 %
EPITHELIAL CELLS: 0 /HPF
ESTIMATED AVERAGE GLUCOSE: 117 MG/DL
GLUCOSE QUALITATIVE U: NEGATIVE MG/DL
GLUCOSE SERPL-MCNC: 90 MG/DL
HBA1C MFR BLD HPLC: 5.7 %
HCT VFR BLD CALC: 48.1 %
HDLC SERPL-MCNC: 62 MG/DL
HGB BLD-MCNC: 15.9 G/DL
IMM GRANULOCYTES NFR BLD AUTO: 0.3 %
KETONES URINE: ABNORMAL MG/DL
LDLC SERPL-MCNC: 152 MG/DL
LEUKOCYTE ESTERASE URINE: NEGATIVE
LYMPHOCYTES # BLD AUTO: 2.18 K/UL
LYMPHOCYTES NFR BLD AUTO: 31.2 %
MAN DIFF?: NORMAL
MCHC RBC-ENTMCNC: 28.1 PG
MCHC RBC-ENTMCNC: 33.1 G/DL
MCV RBC AUTO: 85.1 FL
MICROSCOPIC-UA: NORMAL
MONOCYTES # BLD AUTO: 0.56 K/UL
MONOCYTES NFR BLD AUTO: 8 %
NEUTROPHILS # BLD AUTO: 3.97 K/UL
NEUTROPHILS NFR BLD AUTO: 56.8 %
NITRITE URINE: NEGATIVE
NONHDLC SERPL-MCNC: 164 MG/DL
PH URINE: 5.5
PLATELET # BLD AUTO: 250 K/UL
POTASSIUM SERPL-SCNC: 4.3 MMOL/L
PROT SERPL-MCNC: 7.6 G/DL
PROTEIN URINE: NEGATIVE MG/DL
RBC # BLD: 5.65 M/UL
RBC # FLD: 13 %
RED BLOOD CELLS URINE: 0 /HPF
SODIUM SERPL-SCNC: 140 MMOL/L
SPECIFIC GRAVITY URINE: 1.03
T4 FREE SERPL-MCNC: 1.3 NG/DL
TESTOST SERPL-MCNC: 453 NG/DL
TRIGL SERPL-MCNC: 67 MG/DL
TSH SERPL-ACNC: 1.54 UIU/ML
UROBILINOGEN URINE: 0.2 MG/DL
VIT B12 SERPL-MCNC: 555 PG/ML
WBC # FLD AUTO: 6.99 K/UL
WHITE BLOOD CELLS URINE: 0 /HPF